# Patient Record
Sex: MALE | Race: WHITE
[De-identification: names, ages, dates, MRNs, and addresses within clinical notes are randomized per-mention and may not be internally consistent; named-entity substitution may affect disease eponyms.]

---

## 2022-10-18 ENCOUNTER — HOSPITAL ENCOUNTER (OUTPATIENT)
Dept: HOSPITAL 90 - WHH | Age: 77
Discharge: HOME | End: 2022-10-18
Attending: FAMILY MEDICINE
Payer: OTHER GOVERNMENT

## 2022-10-18 DIAGNOSIS — Z79.899: ICD-10-CM

## 2022-10-18 DIAGNOSIS — M19.90: ICD-10-CM

## 2022-10-18 DIAGNOSIS — F17.290: ICD-10-CM

## 2022-10-18 DIAGNOSIS — E66.9: ICD-10-CM

## 2022-10-18 DIAGNOSIS — L97.811: ICD-10-CM

## 2022-10-18 DIAGNOSIS — F17.200: ICD-10-CM

## 2022-10-18 DIAGNOSIS — I50.20: ICD-10-CM

## 2022-10-18 DIAGNOSIS — I87.2: ICD-10-CM

## 2022-10-18 DIAGNOSIS — I11.0: ICD-10-CM

## 2022-10-18 DIAGNOSIS — L97.821: ICD-10-CM

## 2022-10-18 DIAGNOSIS — I89.0: Primary | ICD-10-CM

## 2022-10-24 ENCOUNTER — HOSPITAL ENCOUNTER (EMERGENCY)
Dept: HOSPITAL 90 - EDH | Age: 77
Discharge: HOME | End: 2022-10-24
Payer: OTHER GOVERNMENT

## 2022-10-24 VITALS — HEIGHT: 71 IN | WEIGHT: 315 LBS | BODY MASS INDEX: 44.1 KG/M2

## 2022-10-24 VITALS — SYSTOLIC BLOOD PRESSURE: 110 MMHG | DIASTOLIC BLOOD PRESSURE: 68 MMHG

## 2022-10-24 DIAGNOSIS — Z88.6: ICD-10-CM

## 2022-10-24 DIAGNOSIS — J44.9: ICD-10-CM

## 2022-10-24 DIAGNOSIS — Z88.8: ICD-10-CM

## 2022-10-24 DIAGNOSIS — Z98.890: ICD-10-CM

## 2022-10-24 DIAGNOSIS — B37.2: Primary | ICD-10-CM

## 2022-10-24 DIAGNOSIS — L20.9: ICD-10-CM

## 2022-10-24 LAB
ALBUMIN SERPL-MCNC: 2.5 G/DL (ref 3.5–5)
ALT SERPL-CCNC: 29 U/L (ref 12–78)
AST SERPL-CCNC: 19 U/L (ref 10–37)
BASOPHILS NFR BLD AUTO: 0.6 % (ref 0–5)
BUN SERPL-MCNC: 26 MG/DL (ref 7–18)
CHLORIDE SERPL-SCNC: 100 MMOL/L (ref 101–111)
CO2 SERPL-SCNC: 31 MMOL/L (ref 21–32)
CREAT SERPL-MCNC: 1.5 MG/DL (ref 0.5–1.5)
EOSINOPHIL NFR BLD AUTO: 7.3 % (ref 0–8)
ERYTHROCYTE [DISTWIDTH] IN BLOOD BY AUTOMATED COUNT: 15.7 % (ref 11–15.5)
GFR SERPL CREATININE-BSD FRML MDRD: 48 ML/MIN (ref 60–?)
GLUCOSE SERPL-MCNC: 139 MG/DL (ref 70–105)
HCT VFR BLD AUTO: 37.1 % (ref 42–54)
LYMPHOCYTES NFR SPEC AUTO: 18.4 % (ref 21–51)
MCH RBC QN AUTO: 29 PG (ref 27–33)
MCHC RBC AUTO-ENTMCNC: 32.1 G/DL (ref 32–36)
MCV RBC AUTO: 90.5 FL (ref 79–99)
MONOCYTES NFR BLD AUTO: 8.9 % (ref 3–13)
NEUTROPHILS NFR BLD AUTO: 63.7 % (ref 40–77)
NRBC BLD MANUAL-RTO: 0 % (ref 0–0.19)
PLATELET # BLD AUTO: 263 K/UL (ref 130–400)
POTASSIUM SERPL-SCNC: 3.9 MMOL/L (ref 3.5–5.1)
PROT SERPL-MCNC: 7.2 G/DL (ref 6–8.3)
RBC # BLD AUTO: 4.1 MIL/UL (ref 4.5–6.2)
SODIUM SERPL-SCNC: 134 MMOL/L (ref 136–145)
WBC # BLD AUTO: 7 K/UL (ref 4.8–10.8)

## 2022-10-24 PROCEDURE — 36415 COLL VENOUS BLD VENIPUNCTURE: CPT

## 2022-10-24 PROCEDURE — 80053 COMPREHEN METABOLIC PANEL: CPT

## 2022-10-24 PROCEDURE — 85025 COMPLETE CBC W/AUTO DIFF WBC: CPT

## 2022-10-25 ENCOUNTER — HOSPITAL ENCOUNTER (OUTPATIENT)
Dept: HOSPITAL 90 - WHH | Age: 77
Discharge: HOME | End: 2022-10-25
Attending: FAMILY MEDICINE
Payer: OTHER GOVERNMENT

## 2022-10-25 DIAGNOSIS — L97.822: ICD-10-CM

## 2022-10-25 DIAGNOSIS — L97.812: ICD-10-CM

## 2022-10-25 DIAGNOSIS — E66.9: ICD-10-CM

## 2022-10-25 DIAGNOSIS — F17.200: ICD-10-CM

## 2022-10-25 DIAGNOSIS — M19.90: ICD-10-CM

## 2022-10-25 DIAGNOSIS — I50.20: ICD-10-CM

## 2022-10-25 DIAGNOSIS — Z79.899: ICD-10-CM

## 2022-10-25 DIAGNOSIS — I87.2: ICD-10-CM

## 2022-10-25 DIAGNOSIS — I11.0: ICD-10-CM

## 2022-10-25 DIAGNOSIS — I89.0: Primary | ICD-10-CM

## 2022-10-25 PROCEDURE — 29580 STRAPPING UNNA BOOT: CPT

## 2023-02-20 ENCOUNTER — HOSPITAL ENCOUNTER (OUTPATIENT)
Dept: HOSPITAL 90 - WHH | Age: 78
Discharge: HOME | End: 2023-02-20
Attending: FAMILY MEDICINE
Payer: OTHER GOVERNMENT

## 2023-02-20 DIAGNOSIS — I87.2: ICD-10-CM

## 2023-02-20 DIAGNOSIS — F17.200: ICD-10-CM

## 2023-02-20 DIAGNOSIS — M19.90: ICD-10-CM

## 2023-02-20 DIAGNOSIS — Z79.899: ICD-10-CM

## 2023-02-20 DIAGNOSIS — I89.0: Primary | ICD-10-CM

## 2023-02-20 DIAGNOSIS — E66.9: ICD-10-CM

## 2023-02-20 DIAGNOSIS — I50.20: ICD-10-CM

## 2023-02-20 DIAGNOSIS — L97.812: ICD-10-CM

## 2023-02-20 DIAGNOSIS — I11.0: ICD-10-CM

## 2023-02-20 DIAGNOSIS — J44.9: ICD-10-CM

## 2023-02-20 DIAGNOSIS — L97.822: ICD-10-CM

## 2023-02-20 PROCEDURE — 29580 STRAPPING UNNA BOOT: CPT

## 2023-03-20 ENCOUNTER — HOSPITAL ENCOUNTER (OUTPATIENT)
Dept: HOSPITAL 90 - WHH | Age: 78
Discharge: HOME | End: 2023-03-20
Attending: FAMILY MEDICINE
Payer: OTHER GOVERNMENT

## 2023-03-20 DIAGNOSIS — I11.0: ICD-10-CM

## 2023-03-20 DIAGNOSIS — I50.20: ICD-10-CM

## 2023-03-20 DIAGNOSIS — E66.9: ICD-10-CM

## 2023-03-20 DIAGNOSIS — I89.0: Primary | ICD-10-CM

## 2023-03-20 DIAGNOSIS — L97.822: ICD-10-CM

## 2023-03-20 DIAGNOSIS — I87.2: ICD-10-CM

## 2023-03-20 DIAGNOSIS — F17.200: ICD-10-CM

## 2023-03-20 DIAGNOSIS — L97.812: ICD-10-CM

## 2023-03-20 DIAGNOSIS — M19.90: ICD-10-CM

## 2023-03-20 DIAGNOSIS — Z79.899: ICD-10-CM

## 2023-03-20 DIAGNOSIS — J44.9: ICD-10-CM

## 2023-05-03 ENCOUNTER — HOSPITAL ENCOUNTER (OUTPATIENT)
Dept: HOSPITAL 90 - EDH | Age: 78
Setting detail: OBSERVATION
LOS: 1 days | Discharge: NURSING, REHAB OR LONG-TERM CARE | End: 2023-05-04
Attending: INTERNAL MEDICINE | Admitting: INTERNAL MEDICINE
Payer: OTHER GOVERNMENT

## 2023-05-03 VITALS — WEIGHT: 315 LBS | HEIGHT: 71 IN | BODY MASS INDEX: 44.1 KG/M2

## 2023-05-03 DIAGNOSIS — J44.9: ICD-10-CM

## 2023-05-03 DIAGNOSIS — I25.10: ICD-10-CM

## 2023-05-03 DIAGNOSIS — Z91.199: ICD-10-CM

## 2023-05-03 DIAGNOSIS — I89.0: ICD-10-CM

## 2023-05-03 DIAGNOSIS — N18.9: ICD-10-CM

## 2023-05-03 DIAGNOSIS — L97.929: ICD-10-CM

## 2023-05-03 DIAGNOSIS — Z98.890: ICD-10-CM

## 2023-05-03 DIAGNOSIS — Z95.5: ICD-10-CM

## 2023-05-03 DIAGNOSIS — G25.81: ICD-10-CM

## 2023-05-03 DIAGNOSIS — E78.00: ICD-10-CM

## 2023-05-03 DIAGNOSIS — Z51.5: ICD-10-CM

## 2023-05-03 DIAGNOSIS — F17.200: ICD-10-CM

## 2023-05-03 DIAGNOSIS — G47.33: ICD-10-CM

## 2023-05-03 DIAGNOSIS — Z79.899: ICD-10-CM

## 2023-05-03 DIAGNOSIS — L03.116: ICD-10-CM

## 2023-05-03 DIAGNOSIS — Z88.1: ICD-10-CM

## 2023-05-03 DIAGNOSIS — I13.0: ICD-10-CM

## 2023-05-03 DIAGNOSIS — I50.9: ICD-10-CM

## 2023-05-03 DIAGNOSIS — L97.919: ICD-10-CM

## 2023-05-03 DIAGNOSIS — E66.01: ICD-10-CM

## 2023-05-03 DIAGNOSIS — L03.115: Primary | ICD-10-CM

## 2023-05-03 DIAGNOSIS — Z86.73: ICD-10-CM

## 2023-05-03 DIAGNOSIS — N17.9: ICD-10-CM

## 2023-05-03 LAB
ALBUMIN SERPL-MCNC: 2.3 G/DL (ref 3.5–5)
ALT SERPL-CCNC: 57 U/L (ref 12–78)
AST SERPL-CCNC: 42 U/L (ref 10–37)
BASOPHILS NFR BLD AUTO: 0.4 % (ref 0–5)
BUN SERPL-MCNC: 68 MG/DL (ref 7–18)
CHLORIDE SERPL-SCNC: 99 MMOL/L (ref 101–111)
CO2 SERPL-SCNC: 28 MMOL/L (ref 21–32)
CREAT SERPL-MCNC: 1.8 MG/DL (ref 0.5–1.5)
EOSINOPHIL NFR BLD AUTO: 1.6 % (ref 0–8)
ERYTHROCYTE [DISTWIDTH] IN BLOOD BY AUTOMATED COUNT: 16.1 % (ref 11–15.5)
GFR SERPL CREATININE-BSD FRML MDRD: 38 ML/MIN (ref 90–?)
GLUCOSE SERPL-MCNC: 103 MG/DL (ref 70–105)
HCT VFR BLD AUTO: 36.1 % (ref 42–54)
LYMPHOCYTES NFR SPEC AUTO: 16.6 % (ref 21–51)
MCH RBC QN AUTO: 28.9 PG (ref 27–33)
MCHC RBC AUTO-ENTMCNC: 32.7 G/DL (ref 32–36)
MCV RBC AUTO: 88.5 FL (ref 79–99)
MONOCYTES NFR BLD AUTO: 10 % (ref 3–13)
NEUTROPHILS NFR BLD AUTO: 70.5 % (ref 40–77)
NRBC BLD MANUAL-RTO: 0 % (ref 0–0.19)
PLATELET # BLD AUTO: 330 K/UL (ref 130–400)
POTASSIUM SERPL-SCNC: 4 MMOL/L (ref 3.5–5.1)
PROT SERPL-MCNC: 6.7 G/DL (ref 6–8.3)
RBC # BLD AUTO: 4.08 MIL/UL (ref 4.5–6.2)
SODIUM SERPL-SCNC: 134 MMOL/L (ref 136–145)
WBC # BLD AUTO: 7.9 K/UL (ref 4.8–10.8)

## 2023-05-03 PROCEDURE — 96366 THER/PROPH/DIAG IV INF ADDON: CPT

## 2023-05-03 PROCEDURE — 96367 TX/PROPH/DG ADDL SEQ IV INF: CPT

## 2023-05-03 PROCEDURE — 83735 ASSAY OF MAGNESIUM: CPT

## 2023-05-03 PROCEDURE — 87040 BLOOD CULTURE FOR BACTERIA: CPT

## 2023-05-03 PROCEDURE — 85025 COMPLETE CBC W/AUTO DIFF WBC: CPT

## 2023-05-03 PROCEDURE — 83605 ASSAY OF LACTIC ACID: CPT

## 2023-05-03 PROCEDURE — 99284 EMERGENCY DEPT VISIT MOD MDM: CPT

## 2023-05-03 PROCEDURE — 94640 AIRWAY INHALATION TREATMENT: CPT

## 2023-05-03 PROCEDURE — 93005 ELECTROCARDIOGRAM TRACING: CPT

## 2023-05-03 PROCEDURE — 96365 THER/PROPH/DIAG IV INF INIT: CPT

## 2023-05-03 PROCEDURE — 84100 ASSAY OF PHOSPHORUS: CPT

## 2023-05-03 PROCEDURE — 84145 PROCALCITONIN (PCT): CPT

## 2023-05-03 PROCEDURE — 96372 THER/PROPH/DIAG INJ SC/IM: CPT

## 2023-05-03 PROCEDURE — 94664 DEMO&/EVAL PT USE INHALER: CPT

## 2023-05-03 PROCEDURE — 81003 URINALYSIS AUTO W/O SCOPE: CPT

## 2023-05-03 PROCEDURE — 80053 COMPREHEN METABOLIC PANEL: CPT

## 2023-05-03 PROCEDURE — 36415 COLL VENOUS BLD VENIPUNCTURE: CPT

## 2023-05-03 PROCEDURE — 80048 BASIC METABOLIC PNL TOTAL CA: CPT

## 2023-05-04 VITALS — SYSTOLIC BLOOD PRESSURE: 112 MMHG | DIASTOLIC BLOOD PRESSURE: 67 MMHG

## 2023-05-04 LAB
APPEARANCE UR: CLEAR
BASOPHILS NFR BLD AUTO: 0.5 % (ref 0–5)
BILIRUB UR QL STRIP: NEGATIVE MG/DL
BUN SERPL-MCNC: 60 MG/DL (ref 7–18)
CHLORIDE SERPL-SCNC: 100 MMOL/L (ref 101–111)
CO2 SERPL-SCNC: 29 MMOL/L (ref 21–32)
COLOR UR: (no result)
CREAT SERPL-MCNC: 1.6 MG/DL (ref 0.5–1.5)
EOSINOPHIL NFR BLD AUTO: 2.3 % (ref 0–8)
ERYTHROCYTE [DISTWIDTH] IN BLOOD BY AUTOMATED COUNT: 16.1 % (ref 11–15.5)
GFR SERPL CREATININE-BSD FRML MDRD: 44 ML/MIN (ref 90–?)
GLUCOSE SERPL-MCNC: 118 MG/DL (ref 70–105)
GLUCOSE UR STRIP-MCNC: NEGATIVE MG/DL
HCT VFR BLD AUTO: 34.4 % (ref 42–54)
HGB UR QL STRIP: NEGATIVE
KETONES UR STRIP-MCNC: NEGATIVE MG/DL
LEUKOCYTE ESTERASE UR QL STRIP: NEGATIVE LEU/UL
LYMPHOCYTES NFR SPEC AUTO: 14.1 % (ref 21–51)
MAGNESIUM SERPL-MCNC: 2.3 MG/DL (ref 1.8–2.4)
MCH RBC QN AUTO: 28.8 PG (ref 27–33)
MCHC RBC AUTO-ENTMCNC: 32.3 G/DL (ref 32–36)
MCV RBC AUTO: 89.4 FL (ref 79–99)
MONOCYTES NFR BLD AUTO: 12.1 % (ref 3–13)
NEUTROPHILS NFR BLD AUTO: 70.1 % (ref 40–77)
NITRITE UR QL STRIP: NEGATIVE
NRBC BLD MANUAL-RTO: 0 % (ref 0–0.19)
PH UR STRIP: 5 [PH] (ref 5–8)
PHOSPHATE SERPL-MCNC: 4.1 MG/DL (ref 2.5–4.9)
PLATELET # BLD AUTO: 292 K/UL (ref 130–400)
POTASSIUM SERPL-SCNC: 4.2 MMOL/L (ref 3.5–5.1)
PROT UR QL STRIP: NEGATIVE MG/DL
RBC # BLD AUTO: 3.85 MIL/UL (ref 4.5–6.2)
SODIUM SERPL-SCNC: 135 MMOL/L (ref 136–145)
SP GR UR STRIP: 1.01 (ref 1–1.03)
UROBILINOGEN UR STRIP-MCNC: 0.2 MG/DL (ref 0.2–1)
WBC # BLD AUTO: 6.4 K/UL (ref 4.8–10.8)

## 2023-05-04 RX ADMIN — IPRATROPIUM BROMIDE AND ALBUTEROL SULFATE SCH UDVIAL: .5; 3 SOLUTION RESPIRATORY (INHALATION) at 07:19

## 2023-05-04 RX ADMIN — IPRATROPIUM BROMIDE AND ALBUTEROL SULFATE SCH UDVIAL: .5; 3 SOLUTION RESPIRATORY (INHALATION) at 11:36

## 2023-09-12 ENCOUNTER — HOSPITAL ENCOUNTER (EMERGENCY)
Dept: HOSPITAL 90 - EDH | Age: 78
Discharge: HOME | End: 2023-09-12
Payer: OTHER GOVERNMENT

## 2023-09-12 VITALS — HEIGHT: 71 IN | WEIGHT: 315 LBS | BODY MASS INDEX: 44.1 KG/M2

## 2023-09-12 VITALS — RESPIRATION RATE: 20 BRPM | HEART RATE: 88 BPM | DIASTOLIC BLOOD PRESSURE: 78 MMHG | SYSTOLIC BLOOD PRESSURE: 156 MMHG

## 2023-09-12 DIAGNOSIS — L03.115: Primary | ICD-10-CM

## 2023-09-12 DIAGNOSIS — I50.9: ICD-10-CM

## 2023-09-12 DIAGNOSIS — Z79.899: ICD-10-CM

## 2023-09-12 DIAGNOSIS — Z88.8: ICD-10-CM

## 2023-09-12 DIAGNOSIS — Z88.1: ICD-10-CM

## 2023-09-12 DIAGNOSIS — L03.116: ICD-10-CM

## 2023-09-12 DIAGNOSIS — Z88.6: ICD-10-CM

## 2023-09-12 DIAGNOSIS — I11.0: ICD-10-CM

## 2023-09-12 DIAGNOSIS — Z88.0: ICD-10-CM

## 2023-09-12 DIAGNOSIS — G25.81: ICD-10-CM

## 2023-09-12 DIAGNOSIS — Z98.890: ICD-10-CM

## 2023-09-12 PROCEDURE — 93971 EXTREMITY STUDY: CPT

## 2024-11-26 ENCOUNTER — HOSPITAL ENCOUNTER (INPATIENT)
Dept: HOSPITAL 90 - EDH | Age: 79
LOS: 7 days | Discharge: NURSING, REHAB OR LONG-TERM CARE | DRG: 871 | End: 2024-12-03
Attending: INTERNAL MEDICINE | Admitting: INTERNAL MEDICINE
Payer: OTHER GOVERNMENT

## 2024-11-26 VITALS — OXYGEN SATURATION: 96 % | HEART RATE: 73 BPM | RESPIRATION RATE: 28 BRPM

## 2024-11-26 VITALS — OXYGEN SATURATION: 91 %

## 2024-11-26 VITALS — WEIGHT: 315 LBS | HEIGHT: 71 IN | BODY MASS INDEX: 44.1 KG/M2

## 2024-11-26 VITALS — RESPIRATION RATE: 18 BRPM | HEART RATE: 81 BPM

## 2024-11-26 VITALS — RESPIRATION RATE: 20 BRPM | HEART RATE: 83 BPM | OXYGEN SATURATION: 94 %

## 2024-11-26 VITALS — HEART RATE: 83 BPM | RESPIRATION RATE: 20 BRPM

## 2024-11-26 DIAGNOSIS — J15.69: ICD-10-CM

## 2024-11-26 DIAGNOSIS — Z88.6: ICD-10-CM

## 2024-11-26 DIAGNOSIS — Z83.3: ICD-10-CM

## 2024-11-26 DIAGNOSIS — N17.0: ICD-10-CM

## 2024-11-26 DIAGNOSIS — J96.01: ICD-10-CM

## 2024-11-26 DIAGNOSIS — Z20.822: ICD-10-CM

## 2024-11-26 DIAGNOSIS — N30.00: ICD-10-CM

## 2024-11-26 DIAGNOSIS — J44.0: ICD-10-CM

## 2024-11-26 DIAGNOSIS — F43.10: ICD-10-CM

## 2024-11-26 DIAGNOSIS — E78.5: ICD-10-CM

## 2024-11-26 DIAGNOSIS — Z79.899: ICD-10-CM

## 2024-11-26 DIAGNOSIS — Z82.49: ICD-10-CM

## 2024-11-26 DIAGNOSIS — Z87.891: ICD-10-CM

## 2024-11-26 DIAGNOSIS — A41.9: Primary | ICD-10-CM

## 2024-11-26 DIAGNOSIS — Z86.718: ICD-10-CM

## 2024-11-26 DIAGNOSIS — I25.10: ICD-10-CM

## 2024-11-26 DIAGNOSIS — L97.919: ICD-10-CM

## 2024-11-26 DIAGNOSIS — Z88.0: ICD-10-CM

## 2024-11-26 DIAGNOSIS — L97.819: ICD-10-CM

## 2024-11-26 DIAGNOSIS — J96.02: ICD-10-CM

## 2024-11-26 DIAGNOSIS — L97.829: ICD-10-CM

## 2024-11-26 DIAGNOSIS — D63.8: ICD-10-CM

## 2024-11-26 DIAGNOSIS — I11.0: ICD-10-CM

## 2024-11-26 DIAGNOSIS — Z88.3: ICD-10-CM

## 2024-11-26 DIAGNOSIS — Z88.1: ICD-10-CM

## 2024-11-26 DIAGNOSIS — B96.1: ICD-10-CM

## 2024-11-26 DIAGNOSIS — E66.01: ICD-10-CM

## 2024-11-26 DIAGNOSIS — Z99.81: ICD-10-CM

## 2024-11-26 DIAGNOSIS — G25.81: ICD-10-CM

## 2024-11-26 DIAGNOSIS — L03.115: ICD-10-CM

## 2024-11-26 DIAGNOSIS — I50.33: ICD-10-CM

## 2024-11-26 DIAGNOSIS — Z95.5: ICD-10-CM

## 2024-11-26 DIAGNOSIS — Z88.8: ICD-10-CM

## 2024-11-26 DIAGNOSIS — L03.116: ICD-10-CM

## 2024-11-26 DIAGNOSIS — I87.2: ICD-10-CM

## 2024-11-26 DIAGNOSIS — E83.42: ICD-10-CM

## 2024-11-26 DIAGNOSIS — Z16.12: ICD-10-CM

## 2024-11-26 DIAGNOSIS — N40.0: ICD-10-CM

## 2024-11-26 DIAGNOSIS — G47.33: ICD-10-CM

## 2024-11-26 DIAGNOSIS — R73.9: ICD-10-CM

## 2024-11-26 DIAGNOSIS — G89.29: ICD-10-CM

## 2024-11-26 DIAGNOSIS — L97.929: ICD-10-CM

## 2024-11-26 DIAGNOSIS — M54.9: ICD-10-CM

## 2024-11-26 DIAGNOSIS — J44.1: ICD-10-CM

## 2024-11-26 LAB
APPEARANCE UR: CLEAR
BACTERIA URNS QL MICRO: (no result) /HPF
BASE EXCESS BLDA CALC-SCNC: 2.1 MMOL/L (ref -2–3)
BILIRUB UR QL STRIP: NEGATIVE MG/DL
BNP SERPL-MCNC: 100 PG/ML (ref 0–100)
BUN SERPL-MCNC: 17 MG/DL (ref 7–18)
CHLORIDE SERPL-SCNC: 102 MMOL/L (ref 101–111)
CO2 SERPL-SCNC: 35 MMOL/L (ref 21–32)
COHGB BLD-MCNC: 0.8 % (ref 0.5–1.5)
COLOR UR: YELLOW
CREAT SERPL-MCNC: 1.3 MG/DL (ref 0.5–1.3)
DEPRECATED SQUAMOUS URNS QL MICRO: (no result) /HPF (ref 0–2)
ERYTHROCYTE [DISTWIDTH] IN BLOOD BY AUTOMATED COUNT: 16.9 % (ref 11–15.5)
GAS PNL BLDA: (no result)
GAS PNL BLDA: 37 CELSIUS (ref 35.5–37)
GFR SERPL CREATININE-BSD FRML MDRD: 56 ML/MIN (ref 90–?)
GLUCOSE SERPL-MCNC: 94 MG/DL (ref 70–105)
GLUCOSE UR STRIP-MCNC: NEGATIVE MG/DL
HCO3 BLDA-SCNC: 29.5 MMOL/L (ref 21–28)
HCT VFR BLD AUTO: 37.8 % (ref 42–54)
HGB UR QL STRIP: NEGATIVE
KETONES UR STRIP-MCNC: 5 MG/DL
LEUKOCYTE ESTERASE UR QL STRIP: 25 LEU/UL
MAGNESIUM SERPL-MCNC: 1.6 MG/DL (ref 1.8–2.4)
MCH RBC QN AUTO: 26.5 PG (ref 27–33)
MCHC RBC AUTO-ENTMCNC: 29.6 G/DL (ref 32–36)
MCV RBC AUTO: 89.6 FL (ref 79–99)
MICRO URNS: YES
MUCOUS THREADS URNS QL MICRO: (no result) LPF
NITRITE UR QL STRIP: NEGATIVE
NRBC BLD MANUAL-RTO: 0 % (ref 0–0.19)
PCO2 BLDA: 60 MMHG (ref 35–48)
PH BLDA: 7.31 [PH] (ref 7.35–7.45)
PH UR STRIP: 5 [PH] (ref 5–8)
PLATELET # BLD AUTO: 314 K/UL (ref 130–400)
PO2 BLDA: 72.5 MMHG (ref 83–108)
POTASSIUM SERPL-SCNC: 3.8 MMOL/L (ref 3.5–5.1)
PROT UR QL STRIP: 10 MG/DL
RBC # BLD AUTO: 4.22 MIL/UL (ref 4.5–6.2)
RBC #/AREA URNS HPF: (no result) /HPF (ref 0–1)
RBC MORPH BLD: (no result)
SAO2 % BLDA: 93 % (ref 94–98)
SARS-COV-2 RNA SPEC QL NAA+PROBE: (no result)
SODIUM SERPL-SCNC: 139 MMOL/L (ref 136–145)
SP GR UR STRIP: 1.02 (ref 1–1.03)
UROBILINOGEN UR STRIP-MCNC: 0.2 MG/DL (ref 0.2–1)
VENTILATION MODE VENT: (no result)
WBC # BLD AUTO: 5.1 K/UL (ref 4.8–10.8)
WBC #/AREA URNS HPF: (no result) /HPF (ref 0–1)

## 2024-11-26 PROCEDURE — 87205 SMEAR GRAM STAIN: CPT

## 2024-11-26 PROCEDURE — 94660 CPAP INITIATION&MGMT: CPT

## 2024-11-26 PROCEDURE — 80048 BASIC METABOLIC PNL TOTAL CA: CPT

## 2024-11-26 PROCEDURE — 82947 ASSAY GLUCOSE BLOOD QUANT: CPT

## 2024-11-26 PROCEDURE — 87186 SC STD MICRODIL/AGAR DIL: CPT

## 2024-11-26 PROCEDURE — 93306 TTE W/DOPPLER COMPLETE: CPT

## 2024-11-26 PROCEDURE — 85025 COMPLETE CBC W/AUTO DIFF WBC: CPT

## 2024-11-26 PROCEDURE — 96375 TX/PRO/DX INJ NEW DRUG ADDON: CPT

## 2024-11-26 PROCEDURE — 83605 ASSAY OF LACTIC ACID: CPT

## 2024-11-26 PROCEDURE — 87040 BLOOD CULTURE FOR BACTERIA: CPT

## 2024-11-26 PROCEDURE — 85027 COMPLETE CBC AUTOMATED: CPT

## 2024-11-26 PROCEDURE — 80053 COMPREHEN METABOLIC PANEL: CPT

## 2024-11-26 PROCEDURE — 83880 ASSAY OF NATRIURETIC PEPTIDE: CPT

## 2024-11-26 PROCEDURE — 93005 ELECTROCARDIOGRAM TRACING: CPT

## 2024-11-26 PROCEDURE — 99285 EMERGENCY DEPT VISIT HI MDM: CPT

## 2024-11-26 PROCEDURE — 82435 ASSAY OF BLOOD CHLORIDE: CPT

## 2024-11-26 PROCEDURE — 94640 AIRWAY INHALATION TREATMENT: CPT

## 2024-11-26 PROCEDURE — 84295 ASSAY OF SERUM SODIUM: CPT

## 2024-11-26 PROCEDURE — 82948 REAGENT STRIP/BLOOD GLUCOSE: CPT

## 2024-11-26 PROCEDURE — 85018 HEMOGLOBIN: CPT

## 2024-11-26 PROCEDURE — 94664 DEMO&/EVAL PT USE INHALER: CPT

## 2024-11-26 PROCEDURE — 93970 EXTREMITY STUDY: CPT

## 2024-11-26 PROCEDURE — 87641 MR-STAPH DNA AMP PROBE: CPT

## 2024-11-26 PROCEDURE — A6248 HYDROGEL DRSG GEL FILLER: HCPCS

## 2024-11-26 PROCEDURE — 36415 COLL VENOUS BLD VENIPUNCTURE: CPT

## 2024-11-26 PROCEDURE — 87804 INFLUENZA ASSAY W/OPTIC: CPT

## 2024-11-26 PROCEDURE — 36600 WITHDRAWAL OF ARTERIAL BLOOD: CPT

## 2024-11-26 PROCEDURE — 87635 SARS-COV-2 COVID-19 AMP PRB: CPT

## 2024-11-26 PROCEDURE — 84484 ASSAY OF TROPONIN QUANT: CPT

## 2024-11-26 PROCEDURE — 84145 PROCALCITONIN (PCT): CPT

## 2024-11-26 PROCEDURE — A6250 SKIN SEAL PROTECT MOISTURIZR: HCPCS

## 2024-11-26 PROCEDURE — 87071 CULTURE AEROBIC QUANT OTHER: CPT

## 2024-11-26 PROCEDURE — 84132 ASSAY OF SERUM POTASSIUM: CPT

## 2024-11-26 PROCEDURE — 87086 URINE CULTURE/COLONY COUNT: CPT

## 2024-11-26 PROCEDURE — 84100 ASSAY OF PHOSPHORUS: CPT

## 2024-11-26 PROCEDURE — 84443 ASSAY THYROID STIM HORMONE: CPT

## 2024-11-26 PROCEDURE — 81001 URINALYSIS AUTO W/SCOPE: CPT

## 2024-11-26 PROCEDURE — 71250 CT THORAX DX C-: CPT

## 2024-11-26 PROCEDURE — 83735 ASSAY OF MAGNESIUM: CPT

## 2024-11-26 PROCEDURE — 71045 X-RAY EXAM CHEST 1 VIEW: CPT

## 2024-11-26 PROCEDURE — 82803 BLOOD GASES ANY COMBINATION: CPT

## 2024-11-26 RX ADMIN — ALBUTEROL SULFATE SCH MG: 2.5 SOLUTION RESPIRATORY (INHALATION) at 22:55

## 2024-11-26 NOTE — HMCIMG
CHEST 1VW



HISTORY: Shortness of breath



COMPARISON: 5/8/2024



FINDINGS: A frontal projection of the chest was obtained. Mild bilateral

pulmonary infiltrates are seen may be related to mild pulmonary vascular

congestion with possible superimposed pneumonitis.  The heart is

borderline enlarged.  Degenerative changes are seen.  No evidence of

aortic calcification is seen.



IMPRESSION:

1. Mild bilateral pulmonary infiltrates are seen may be related to mild

pulmonary vascular congestion with possible superimposed pneumonitis.

## 2024-11-26 NOTE — NUR
Pain

Patient reported pain score 10/10 to BLE. Morphine 2mg IV push given at this time to 20G to 
right forearm. Patient tolerating bipap.

## 2024-11-26 NOTE — HP
[facility] HISTORY AND PHYSICAL








Date of Service: Nov 26, 2024 


Time of Service: 23:10





LEO MOTA MD (PCP)





Supervising/attending physicians:  Dr. GAYATHRI Feliciano and Dr. Shell





HISTORY OF PRESENT ILLNESS:


Mr. Lozoya is an 79-year-old  male with a history of hypertension, CAD,

obstructive sleep apnea, COPD, O2 dependent usually on 3 L O2, chronic knee and 

back pain, AAA, and chronic bilateral lower extremity venous ulcers, chronic 

lower extremity cellulitis who presented to [facility] [place] for evaluation of 

shortness of breath.  According to EMS [facility].  The patient reported cough, congestion, and some mild 

difficulty breathing. 





Chest x-ray:  Mild bilateral pulmonary infiltrates are seen may be related to 

mild pulmonary vascular congestion with possible superimposed pneumonitis.  ABGs

:  PH 7.308, pCO2 60, PO2 72.5, bicarbonate 29.5, O2 sats 93.





ED provider request patient be admitted to the hospital with a diagnosis of 

pneumonia, status dermatitis to bilateral lower extremities, and COPD 

exacerbation.





I went to assess patient at bedside in ED 15.  The patient was tachypneic, 

wheezing, and lungs sounded wet.  Patient had a very productive cough, and was 

spitting phlegm into a napkin while the I assessed him.  I informed the patient 

of labs, including ABGs, diagnostics, plan of care.  The patient verbalized 

understanding and since agreement with the plan.  Plan and assessment are listed

below.





REVIEW OF SYSTEMS


12-point ROS reviewed with patient.  All pertinent positives mentioned above.  

Otherwise negative, noncontributory, or non-pertinent.





PAST MEDICAL HISTORY:


Morbid obesity


COPD


Obstructive sleep apnea


Restless leg syndrome


Chronic bilateral lower extremity venous ulcers


Chronic lower extremity cellulitis





PAST SURGICAL HISTORY:


Back surgery x2


cardiac stents





PAST SOCIAL HISTORY:


Patient denies illicit drug use but has history of occasional smoking and 

alcohol drinking





FAMILY HISTORY:


CAD, DM, HTN


Coded Allergies:  


     piperacillin (Verified  Allergy, Severe, ANAPHYLAXIS, 3/28/23)


   3/26/2023


     tazobactam (Verified  Allergy, Severe, ANAPHYLAXIS, 3/28/23)


   3/26/2023


     vancomycin (Verified  Allergy, Severe, anaphylaxis, 3/28/23)


   3/26/2023.


     gabapentin (Unverified  Adverse Reaction, Unknown, 10/24/22)


   GI UPSET.


     naproxen (Unverified  Adverse Reaction, Unknown, 10/24/22)


   GI UPSET





PHYSICAL EXAM


GENERAL APPEARANCE:  The patient is awake, alert, and oriented, in no acute 

cardiopulmonary distress.


NEUROLOGICAL:  Cranial nerves II-XII grossly intact.  Motor is 5/5 in bilateral 

upper and lower extremities proximal to distal.  No sensory deficits.


HEENT:  Face is symmetric. Pupils are equal and reactive.  Extraocular movements

are intact.


NECK:  Supple.  No JVD. No thyromegaly. No submental, submandibular, pre-

/postauricular, occipital or supraclavicular lymphadenopathy.


CHEST:  Normal chest expansion. No Telemetry.


LUNGS:  Tachypneic, wheezing, and lungs sounded wet.  Patient had a very 

productive cough, and was spitting phlegm into a napkin while the I assessed 

him.  


CARDIOVASCULAR:  Regular.  S1 and S2 normal.  No appreciable rubs, murmurs or 

gallops. 


ABDOMEN:  Soft, nontender, and nondistended.  There is no rebound, voluntary 

guarding, or rigidity.


:  Deferred. No Antunez.


EXTREMITIES:  Non-edematous and not cyanotic.  No clubbing.  Good capillary 

refill.


SKIN:  No skin breakdown.





                           Vital Sign (Last 24 Hours)








 11/26/24 11/26/24 11/26/24





 17:49 20:26 22:59


 


Temp 98.4  


 


Pulse   83


 


Resp   20


 


B/P (MAP)  126/48 


 


Pulse Ox  98 


 


O2 Delivery   N/Cannula Low lpm


 


O2 Flow Rate   3.0


 


FiO2   32











LABS:








                                   Laboratory:








Test


 11/26/24


21:13 11/26/24


21:05 11/26/24


18:34 11/26/24


18:17 Range/Units


 


 


Urine Color YELLOW     YELLOW  


 


Urine Appearance CLEAR     CLEAR  


 


Urine pH 5.0     5.0-8.0  


 


Urine Specific Gravity 1.024     1.001-1.031  


 


Urine Protein 10 H    NEGATIVE  mg/dL


 


Urine Glucose (UA) NEGATIVE     NEGATIVE  mg/dL


 


Urine Ketones 5 H    NEGATIVE  mg/dL


 


Urine Occult Blood NEGATIVE     NEGATIVE  


 


Urine Nitrate NEGATIVE     NEGATIVE  


 


Urine Bilirubin NEGATIVE     NEGATIVE  mg/dL


 


Urine Urobilinogen 0.2     0.2-1.0  mg/dL


 


Urine Leukocyte Esterase


 25 H


 


 


 


 NEGATIVE


Pete/uL


 


Urine RBC 0-1     0-1  /HPF


 


Urine WBC 2-5 H    0-1  /HPF


 


Urine Squamous Epithelial


Cells RARE 


 


 


 


 0-2  /HPF





 


Urine Bacteria RARE     None Seen  /HPF


 


Influenza Type A Antigen


 


 Negative For


Type A 


 


 NEGATIVE  





 


Influenza Type B Antigen


 


 Negative For


Type B 


 


 NEGATIVE  





 


SARS-CoV-2, RNA, NAAT


 


 NEGATIVE SARS


CoV-2 


 


 NEGATIVE  





 


Blood Gas Specimen Type   Arterial    


 


Arterial Blood pH   7.308 L  7.350-7.450  


 


Arterial Blood Partial


Pressure CO2 


 


 60 *H


 


 35-48  mmHg





 


Arterial Blood Partial


Pressure O2 


 


 72.5 L


 


 83.0-108.0


mmHg


 


Arterial Blood HCO3


 


 


 29.5 H


 


 21.0-28.0


mmol/L


 


Arterial Blood Oxygen


Saturation 


 


 93.0 L


 


 94.0-98.0  %





 


Arterial Blood Base Excess


 


 


 2.1 


 


 -2.0-3.0


mmol/L


 


Hemoglobin (Blood Gas)   11.3 L  13.5-17.5  g/dL


 


Sodium (Blood Gas)   139   136-145  MMOL/L


 


Bedside Potassium (Blood Gas)   3.9   3.4-4.5  MMOL/L


 


Bedside Chloride (Blood Gas)   103     MMOL/L


 


Bedside Glucose (Blood Gas)   103 H  65-95  MG/DL


 


Bedside Ionized Calcium


(Blood Gas) 


 


 1.13 L


 


 1.15-1.33


MMOL/L


 


Bedside Lactic Acid (Blood


Gas) 


 


 1.14 H


 


 0.36-0.75


MMOL/L


 


Blood Gas Temperature


 


 


 37.0 


 


 35.5-37.0


CELSIUS


 


Blood Gas Flow-by


 


 


 3.00 


 


 0.00-15.00


L/min


 


Blood Gas Vent Mode   3LNC   ROOM AIR  


 


FiO2   32.0    %


 


Blood Gas Specimen Comment   RR RN    


 


White Blood Count    5.1  4.8-10.8  K/uL


 


Red Blood Count


 


 


 


 4.22 L


 4.50-6.20


MIL/uL


 


Hemoglobin    11.2 L 14.0-18.0  g/dL


 


Hematocrit    37.8 L 42-54  %


 


Mean Corpuscular Volume    89.6  79-99  fL


 


Mean Corpuscular Hemoglobin    26.5 L 27.0-33.0  pg


 


Mean Corpuscular Hemoglobin


Concent 


 


 


 29.6 L


 32.0-36.0  g/dL





 


Red Cell Distribution Width    16.9 H 11.0-15.5  %


 


Platelet Count    314  130-400  K/uL


 


Mean Platelet Volume    9.1  7.5-10.5  fL


 


Nucleated Red Blood Cells    0.0  0.0-0.19  %


 


Red Blood Cell Morphology    See comments   


 


Sodium Level    139  136-145  mmol/L


 


Potassium Level    3.8  3.5-5.1  mmol/L


 


Chloride Level    102  101-111  mmol/L


 


Carbon Dioxide Level    35 H 21-32  mmol/L


 


Blood Urea Nitrogen    17  7-18  mg/dL


 


Creatinine    1.3  0.5-1.3  mg/dL


 


Glomerular Filtration Rate


Calc 


 


 


 56 


 >90  mL/min





 


Random Glucose    94    mg/dL


 


Lactic Acid Level    2.0  0.8-2.5  mmol/L


 


Total Calcium    8.4 L 8.5-10.1  mg/dL


 


Magnesium Level


 


 


 


 1.60 L


 1.80-2.40


mg/dL


 


Troponin I High Sensitivity    7  4-75  ng/L


 


B-Type Natriuretic Peptide    100  0-100  pg/mL


 


Procalcitonin    < 0.05 L 0.05-0.5  ng/mL











                               Current Medications








 Medications


  (Trade)  Dose


 Ordered  Sig/Christi


 Route


 PRN Reason  Start Time


 Stop Time Status Last Admin


Dose Admin


 


 Acetylcysteine


  (MUComyst 20%


 4ML)  400 mg  [id number]


 


   11/26/24 22:10


 12/26/24 22:09  11/26/24 22:55


400 MG


 


 Albuterol Sulfate


  (Proventil


 0.083% 2.5mg/3ml)  2.5 mg  [id number]


 


   11/26/24 22:00


 12/26/24 21:59  11/26/24 22:55


2.5 MG


 


 Ipratropium


 Bromide


  (AtrovENT UD)  0.5 mg  [id number]


 


   11/26/24 22:00


 12/26/24 21:59  11/26/24 22:55


0.5 MG











DIAGNOSTICS / RADIOLOGY:


[ ]





ASSESSMENT:


Acute hypoxemic hypercapnic respiratory failure, requiring BiPAP 


COPD exacerbation


Acute complicated cystitis, POA


Chronic bilateral lower extremity venous ulcers


Acute on chronic lower extremity cellulitis


Anemia of chronic disease 


Hypomagnesemia


Hyperglycemia


Obstructive sleep apnea


Morbid obesity, BMI 41.1


Chronic problem list:  HLP, BPH, anemia, chronic back and knee pain, PTSD, 

morbid obesity





PLAN:


-Admit to PCCU with continuous telemetry monitoring


-Monitor respiratory status closely.


-Start BiPAP, titrate oxygen prn to keep Spo2>/+=92%.


-monitor ABGs and chest x-ray


-Albuterol and Atrovent nebulizer treatments scheduled q.4 hours.  


-RT to provide IS and education on use.


-Robitussin DM as needed cough.


-Start montelukast 10 mg p.o. daily.


-start Mucomyst 400 mg IH q.4 hours.


-Solu-Medrol 60 mg IV q.6 hours


-Continue antibiotic therapy:  Levaquin IV (allergies to vancomycin and 

piperacillin,tazobactam)


-Consult pulmonology for acute hypoxemic and hypercapnic respiratory failure, 

COPD exacerbation.  


-Consult infectious disease for acute on chronic/ recurrent lower extremity 

cellulitis.


-p.r.n. medications for: Pain management, fever, nausea, vomiting, constipation,

hypertension


-Glucometer checks AC & HS needed with insulin regular sliding scale coverage as

needed.


-Blood pressure checks every 4 hours and as needed. 


-Reconcile home medications once available. 


-AM labs: monitor renal and liver function, monitor electrolytes and replace PRN


-GI and DVT prophylaxis











SHARI RUDD FN          Nov 26, 2024 23:10

## 2024-11-26 NOTE — NUR
[facility] call back

Received call back from Kareen STOKES. Advised of patient's complaint of pain to BLE. 
As per Marisol STOKES: Morphine 2mg IV push x1 dose now. Orders for Tylenol PRN have been 
placed. Orders noted and carried out.

## 2024-11-26 NOTE — NUR
Arrival on floor

Patient arrived on floor. Patient was able to transfer to bed with x1 assistance. Noted 
dressing to BLE with purulent drainage. Dressings will need to be changed. Noted swelling to 
mid abdomen when laying flat. Patient with history of abdominal aortic aneurysm. Patient 
verbalized that he has a wound to his buttock as well. Patient was changed at this time. 
Patient currently on an adult brief from home. Brief changed at this time. Gown applied to 
patient along with telemetry monitor. 20G IV noted to right forearm near wrist. Currently on 
ordered antibiotic. Bipap ordered per admitting NP. Respiratory at bedside. Patient voiced 
he did not need bipap. I advised him to tolerate as much as possible due to abnormal ABGs. 
Patient verbalized understanding. Will continue to monitor.

## 2024-11-26 NOTE — NUR
Pain

Patient reported pain to BLE. No pain medication noted on file. Call placed to [facility] 
hospitalist group answering service. Pending call back from Kareen STOKES.

## 2024-11-26 NOTE — ERN
General


Chief Complaint:  Shortness of Breath


Stated Complaint:  SOB


Time Seen by MD:  17:51


Time Seen by Midlevel:  17:51


Source:  patient, EMS





History of Present Illness


Initial Comments


Patient is an 79-year-old male being brought in via EMS for evaluation of 

shortness of breath.  According to EMS [facility] 

[facility].  On arrival patient does report cough congestion and some 

mild difficulty breathing.  He does admit to being O2 dependent and is usually 

on 3 L of oxygen.


Allergies:  


Coded Allergies:  


     piperacillin (Verified  Allergy, Severe, ANAPHYLAXIS, 3/28/23)


   3/26/2023


     tazobactam (Verified  Allergy, Severe, ANAPHYLAXIS, 3/28/23)


   3/26/2023


     vancomycin (Verified  Allergy, Severe, anaphylaxis, 3/28/23)


   3/26/2023.


     gabapentin (Unverified  Adverse Reaction, Unknown, 10/24/22)


   GI UPSET.


     naproxen (Unverified  Adverse Reaction, Unknown, 10/24/22)


   GI UPSET


Home Meds


Reported Medications


Ondansetron HCl (Ondansetron HCl) 4 Mg Tablet, 4 MG PO Q6HPRN, TAB


   24


Metolazone (Metolazone) 5 Mg Tablet, 5 MG PO Q2D, TAB


   24


Prazosin HCl (Prazosin HCl) 1 Mg Capsule, 1 MG PO HS, CAP


   24


Atorvastatin Calcium (LIPITOR) 40 Mg Tablet, 40 MG PO HS, TAB


   24


Sertraline HCl (Sertraline HCl) 25 Mg Tablet, 25 MG PO DAILY, TAB


   24


Ropinirole HCl (Ropinirole HCl) 4 Mg Tablet, 4 MG PO QIDP PRN for LEG PAIN, TAB


   23


Multivitamin (Multivitamin) 1 Each Tablet, 1 EACH PO DAILY, TAB


   3/27/23


Tamsulosin HCl (Flomax) 0.4 Mg Cap.er.24h, 0.4 MG PO HS, CAPSULE.DR


   3/27/23


Spironolactone (Spironolactone) 25 Mg Tablet, 25 MG PO DAILY, TAB


   3/27/23


Venlafaxine HCl (Venlafaxine HCl) 50 Mg Tablet, 150 MG PO DAILY, TAB


   3/27/23


Potassium Chloride (Potassium Chloride) 20 Meq Tablet.er, 40 MEQ PO DAILY, TAB


   3/27/23


Simvastatin (ZOCOR) 40 Mg Tablet, 40 MG PO HS, TAB


   3/27/23


Mirtazapine (Mirtazapine) 30 Mg Tablet, 30 MG PO HS, TAB


   3/27/23





Past Medical History


Past Medical History:  CAD, COPD, Hypertension


Medical History Other:  SLEEP APNEA, RLS, CHRONIC KNEE AND BACK PAIN, AAA, FABIANA

LULITIS LOWER LEGS


Past Surgical History:  Other


Surgical History Other:  BACK SX





Family History


Family History:  CAD, DM, HTN





Social History


Social History:  Negative, Lives with family





ROS Dictation


CONSTITUTIONAL:  Negative except for HPI


HEAD/FACE:  Negative except for HPI


EENT:  Negative except for HPI


RESPIRATORY: Negative except for HPI


GASTROINTESTINAL/ABDOMINAL:   Negative except for HPI


GENITOURINARY: Negative except for HPI


MUSCULOSKELETAL: Negative except for HPI


INTEGUMENTARY:  Negative except for HPI


NEUROLOGICAL/PSYCH: Negative except for HPI


HEMATOLOGIC/LYMPHATIC:  Negative except for HPI





All Systems Negative, Except as noted above.





13 point review of systems assessed and all negative except for above.





Physical Exam


Physical Exam Dictation


Vital Signs reviewed 


General Appearance: Alert, oriented x 3, no acute distress, ill-appearing, 

morbidly obese


Head and Face: non-traumatic.


Eyes: PERRL, pink conjunctivas, eyelid no trauma, anterior chamber with arcus 

senilis. 


Ears: Pinnas intact and no signs of trauma or erythema ear canals clear and no 

discharge TM no erythema 


Nose: No discharge, no bleeding. 


Oropharynx: Mouth normal, tongue pink, 


pharynx clear,no erythema, tonsils no exudates, no abscesses noted,  mucous 

membrane moist 


Neck: Supple, non-tender, no thyromegaly, no masses, no JVD, no bruits 


Breast:Deferred 


Chest:No tenderness, no crepitus, no paradoxical movement, no retractions 


Lungs:  Diffuse wheezing to bilateral lung fields, productive cough during my 

examination


Heart: Regular rate, regular rhythm, no murmur, no gallops 


Vascular:  Extensive amount of swelling to bilateral lower extremities


Abdomen: Soft, positive bowel sounds, nondistended, no guarding, 


nontender, no rebound, no masses no hepatomegaly, no splenomegaly, no Rm's 

sign, no hernias.


Rectal: Deferred


Genital: Deferred


Neurological: Normal speech,  motor function intact, sensory function intact 


Musculoskeletal: Neck nontender, full range of motion, back nontender, full 

range of motion,


Extremities: nontender, full range of motion 


Skin:  Stasis dermatitis to bilateral lower extremities, there was also erythema

to bilateral lower extremities with multiple superficial skin tear/abrasions


Lymphatic: Deferred





Results


Laboratory and Microbiology


Lab and Micro Result





Laboratory Tests








Test


 24


18:17 24


18:34


 


White Blood Count


 5.1 K/uL


(4.8-10.8) 





 


Red Blood Count


 4.22 MIL/uL


(4.50-6.20)  L 





 


Hemoglobin


 11.2 g/dL


(14.0-18.0)  L 





 


Hematocrit


 37.8 % (42-54)


L 





 


Mean Corpuscular Volume


 89.6 fL


(79-99) 





 


Mean Corpuscular Hemoglobin


 26.5 pg


(27.0-33.0)  L 





 


Mean Corpuscular Hemoglobin


Concent 29.6 g/dL


(32.0-36.0)  L 





 


Red Cell Distribution Width


 16.9 %


(11.0-15.5)  H 





 


Platelet Count


 314 K/uL


(130-400) 





 


Mean Platelet Volume


 9.1 fL


(7.5-10.5) 





 


Nucleated Red Blood Cells


 0.0 %


(0.0-0.19) 





 


Red Blood Cell Morphology See comments   


 


Sodium Level


 139 mmol/L


(136-145) 





 


Potassium Level


 3.8 mmol/L


(3.5-5.1) 





 


Chloride Level


 102 mmol/L


(101-111) 





 


Carbon Dioxide Level


 35 mmol/L


(21-32)  H 





 


Blood Urea Nitrogen


 17 mg/dL


(7-18) 





 


Creatinine


 1.3 mg/dL


(0.5-1.3) 





 


Glomerular Filtration Rate


Calc 56 mL/min


(>90) 





 


Random Glucose


 94 mg/dL


() 





 


Lactic Acid Level


 2.0 mmol/L


(0.8-2.5) 





 


Total Calcium


 8.4 mg/dL


(8.5-10.1)  L 





 


Magnesium Level


 1.60 mg/dL


(1.80-2.40)  L 





 


Troponin I High Sensitivity 7 ng/L (4-75)   


 


B-Type Natriuretic Peptide


 100 pg/mL


(0-100) 





 


Procalcitonin


 < 0.05 ng/mL


(0.05-0.5)  L 





 


Blood Gas Specimen Type  Arterial  


 


Arterial Blood pH


 


 7.308


(7.350-7.450)


 


Arterial Blood Partial


Pressure CO2 


 60 mmHg


(35-48)  *H


 


Arterial Blood Partial


Pressure O2 


 72.5 mmHg


(83.0-108.0)  L


 


Arterial Blood HCO3


 


 29.5 mmol/L


(21.0-28.0)  H


 


Arterial Blood Oxygen


Saturation 


 93.0 %


(94.0-98.0)  L


 


Arterial Blood Base Excess


 


 2.1 mmol/L


(-2.0-3.0)


 


Hemoglobin (Blood Gas)


 


 11.3 g/dL


(13.5-17.5)  L


 


Sodium (Blood Gas)


 


 139 MMOL/L


(136-145)


 


Bedside Potassium (Blood Gas)


 


 3.9 MMOL/L


(3.4-4.5)


 


Bedside Chloride (Blood Gas)


 


 103 MMOL/L


()


 


Bedside Glucose (Blood Gas)


 


 103 MG/DL


(65-95)  H


 


Bedside Ionized Calcium


(Blood Gas) 


 1.13 MMOL/L


(1.15-1.33)  L


 


Bedside Lactic Acid (Blood


Gas) 


 1.14 MMOL/L


(0.36-0.75)  H


 


Blood Gas Temperature


 


 37.0 CELSIUS


(35.5-37.0)


 


Blood Gas Flow-by


 


 3.00 L/min


(0.00-15.00)


 


Blood Gas Vent Mode


 


 3LNC (ROOM


AIR)


 


FiO2  32.0 %  


 


Blood Gas Specimen Comment  RR RN  








Labs Reviewed?:  Yes





MDM


MDM: 


Differential diagnosis:  COPD exacerbation, pneumonia, cellulitis


Rationale: Tests considered and ordered secondary to shared decision making 

include: 


Previous outside records reviewed: Old ER visits.


Risk of complication and/or morbidity or mortality of patient management: None


Medications-Per medication reconciliation


Need for hospitalization: Patient does meet criteria for hospitalization.


Need for emergency major/minor surgery: No





There are no social concerns with this patient.





Prescription drug management


Prescriptions will include symptomatic care





Patient's prior external medical records from other ER visits were reviewed by m

e as indicated.  Prior testing and results from previous visits were reviewed.  

Prior tests were taken into account with medical decision making and resource 

utilization, independent historian/historians were used to obtain complete 

medical history.





I independently interpreted the test that were performed, results were reviewed 

by me and considered findings on radiology if ordered.





Medical management and examination interpretation discussions were had by me 

with other qualified healthcare professionals as indicated for the patient's 

care.


ED Course





Orders








Procedure Category Date Status





  Time 


 


Cbc Without LAB 24 Complete





Differential  18:02 


 


Basic Metabolic Panel LAB 24 Complete





  18:02 


 


B-Type Natriuretic LAB 24 Complete





Peptide  18:02 


 


Troponin I High LAB 24 Complete





Sensitivity  18:02 


 


Lactic Acid LAB 24 Complete





  18:02 


 


Blood Cult JAY 24 In Process





  18:02 


 


12 Lead Ekg Tracing- EKG 24 Logged





Technical  18:02 


 


Chest 1vw RAD 24 Resulted





  18:02 


 


Arterial Blood Gas + RT 24 Transmitted





  18:04 


 


Ipratropium/Albuterol PHA 24 Complete





Neb (Duoneb)  18:30 


 


Procalcitonin LAB 24 Complete





  18:08 


 


Urinalysis Profile LAB 24 Complete





  18:08 


 


Magnesium LAB 24 Complete





  18:02 


 


Arterial Blood Gas LAB 24 Complete





Arterial +  18:34 


 


Levofloxacin 500 PHA 24 Complete





Mg/D5w 100 Ml  20:30 


 


Methylprednisolone PHA 24 Complete





Succ 125mg (Solu-Medr  20:30 


 


Covid Rna Naat LAB 24 Complete





  20:29 


 


Influenza Type A & B, LAB 24 Complete





Rapid  20:29 








Current Medications








 Medications


  (Trade)  Dose


 Ordered  Sig/Christi


 Route


 PRN Reason  Start Time


 Stop Time Status Last Admin


Dose Admin


 


 Albuterol


  (DUOneb)  1 UDVIAL  ONCE  ONCE


 IH


   24 18:30


 24 18:31 DC 24 18:31





 


 Levofloxacin/


 Dextrose  100 ml @ 


 100 mls/hr  ONCE  ONCE


 IV


   24 20:30


 24 21:29 DC 24 21:03





 


 Methylprednisolone


 Sodium Succinate


  (Solu-medROL


 125MG)  125 mg  ONCE  ONCE


 IVP


   24 20:30


 24 20:34 DC 24 21:03











Vital Signs








  Date Time  Temp Pulse Resp B/P (MAP) Pulse Ox O2 Delivery O2 Flow Rate FiO2


 


24 20:26  85 16 126/48 98 Nasal Cannula* 4 36


 


24 18:39  81 18     


 


24 18:32  80 14 108/50 98 Nasal Cannula* 2 28


 


24 17:49 98.4 80 20 124/68 93 Nasal Cannula 2.0 





[facility]


                    [address] [place] [address]


                                 [phone]


                                        


                                 IMAGING REPORT


                                     Signed





PATIENT: GEOVANY RODRIGUEZ                            MR#: [id number]


ACCOUNT#: [id number]                              : 1945


SEX: M AGE: 79                                      LOCATION: [facility]


ORDER DT: 24                             STATUS: REG ER


ACCESSION#: [id number]                            REPORT#: [id number]


SERVICE DT: 24





REASON: SOB


ORDERING PHYSICIAN: SILVIA KEATING


PROCEDURE: CXR1VW  -  CHEST 1VW





CHEST 1VW





HISTORY: Shortness of breath





COMPARISON: 2024





FINDINGS: A frontal projection of the chest was obtained. Mild bilateral


pulmonary infiltrates are seen may be related to mild pulmonary vascular


congestion with possible superimposed pneumonitis.  The heart is


borderline enlarged.  Degenerative changes are seen.  No evidence of


aortic calcification is seen.





IMPRESSION:


1. Mild bilateral pulmonary infiltrates are seen may be related to mild


pulmonary vascular congestion with possible superimposed pneumonitis. 











DICTATED BY: JAEL CELAYA MD


DATE: 24





ELECTRONICALLY SIGNED BY: JAEL CELAYA MD


DATE: 24








DX & DISP


Disposition:  Inpatient


Decision to Admit Date:  2024


Decision to Admit Time:  20:37


Departure


Impression:  


   Primary Impression:  Pneumonia


   Additional Impressions:  Stasis dermatitis of both legs, COPD exacerbation


Condition:  Stable


Referrals:  


LEO MOTA MD (PCP)


I have reviewed the case, and I agree with, Diagnosis and Plan


I performed the substantive portion of the visit.  I have reviewed and 

personally made and approve the management plan that is documented in the note 

by myself or the SUHA. I acknowledge for responsibility for the patient's 

management plan.











SILVIA KEATING                2024 20:36

## 2024-11-26 NOTE — NUR
Report

Report received from Lc ABRAHAM at this time. States nurse practitioner in room currently 
assessing patient. Will be transferred to room 302 after assessment done. Patient is 
currently on 3L/NC saturating 94%. Patient does not ambulate but is awake, alert and 
oriented x4.

## 2024-11-27 VITALS
TEMPERATURE: 97.6 F | RESPIRATION RATE: 19 BRPM | DIASTOLIC BLOOD PRESSURE: 62 MMHG | HEART RATE: 81 BPM | SYSTOLIC BLOOD PRESSURE: 126 MMHG

## 2024-11-27 VITALS
SYSTOLIC BLOOD PRESSURE: 106 MMHG | HEART RATE: 76 BPM | DIASTOLIC BLOOD PRESSURE: 51 MMHG | TEMPERATURE: 98.6 F | RESPIRATION RATE: 18 BRPM

## 2024-11-27 VITALS
HEART RATE: 86 BPM | DIASTOLIC BLOOD PRESSURE: 69 MMHG | SYSTOLIC BLOOD PRESSURE: 143 MMHG | RESPIRATION RATE: 19 BRPM | TEMPERATURE: 98.8 F

## 2024-11-27 VITALS
SYSTOLIC BLOOD PRESSURE: 118 MMHG | HEART RATE: 91 BPM | DIASTOLIC BLOOD PRESSURE: 50 MMHG | RESPIRATION RATE: 20 BRPM | TEMPERATURE: 98.9 F

## 2024-11-27 VITALS — RESPIRATION RATE: 28 BRPM | HEART RATE: 81 BPM | OXYGEN SATURATION: 94 %

## 2024-11-27 VITALS — RESPIRATION RATE: 28 BRPM | OXYGEN SATURATION: 92 % | HEART RATE: 83 BPM

## 2024-11-27 VITALS — HEART RATE: 91 BPM | RESPIRATION RATE: 25 BRPM

## 2024-11-27 VITALS — OXYGEN SATURATION: 91 %

## 2024-11-27 VITALS — HEART RATE: 83 BPM | RESPIRATION RATE: 28 BRPM

## 2024-11-27 VITALS — RESPIRATION RATE: 28 BRPM | HEART RATE: 81 BPM

## 2024-11-27 VITALS — OXYGEN SATURATION: 93 %

## 2024-11-27 VITALS
TEMPERATURE: 97.4 F | RESPIRATION RATE: 19 BRPM | HEART RATE: 83 BPM | SYSTOLIC BLOOD PRESSURE: 141 MMHG | DIASTOLIC BLOOD PRESSURE: 56 MMHG

## 2024-11-27 VITALS — RESPIRATION RATE: 25 BRPM | OXYGEN SATURATION: 93 % | HEART RATE: 86 BPM

## 2024-11-27 VITALS — HEART RATE: 88 BPM | RESPIRATION RATE: 22 BRPM

## 2024-11-27 VITALS
TEMPERATURE: 98.6 F | DIASTOLIC BLOOD PRESSURE: 44 MMHG | HEART RATE: 85 BPM | RESPIRATION RATE: 18 BRPM | SYSTOLIC BLOOD PRESSURE: 113 MMHG

## 2024-11-27 VITALS — HEART RATE: 90 BPM | OXYGEN SATURATION: 90 % | RESPIRATION RATE: 22 BRPM

## 2024-11-27 VITALS
HEART RATE: 81 BPM | TEMPERATURE: 98 F | DIASTOLIC BLOOD PRESSURE: 53 MMHG | SYSTOLIC BLOOD PRESSURE: 104 MMHG | RESPIRATION RATE: 19 BRPM

## 2024-11-27 VITALS — HEART RATE: 88 BPM | OXYGEN SATURATION: 92 % | RESPIRATION RATE: 22 BRPM

## 2024-11-27 VITALS — HEART RATE: 89 BPM | RESPIRATION RATE: 28 BRPM

## 2024-11-27 VITALS — RESPIRATION RATE: 28 BRPM | HEART RATE: 93 BPM

## 2024-11-27 LAB
BASE EXCESS BLDA CALC-SCNC: 0.3 MMOL/L (ref -2–3)
BASOPHILS # BLD AUTO: 0 K/UL (ref 0–0.2)
BASOPHILS NFR BLD AUTO: 0 % (ref 0–5)
BUN SERPL-MCNC: 15 MG/DL (ref 7–18)
CHLORIDE SERPL-SCNC: 105 MMOL/L (ref 101–111)
CO2 SERPL-SCNC: 31 MMOL/L (ref 21–32)
CREAT SERPL-MCNC: 1.2 MG/DL (ref 0.5–1.3)
EOSINOPHIL # BLD AUTO: 0.01 K/UL (ref 0–0.7)
EOSINOPHIL NFR BLD AUTO: 0.2 % (ref 0–8)
ERYTHROCYTE [DISTWIDTH] IN BLOOD BY AUTOMATED COUNT: 16.5 % (ref 11–15.5)
GAS PNL BLDA: 37 CELSIUS (ref 35.5–37)
GFR SERPL CREATININE-BSD FRML MDRD: 61 ML/MIN (ref 90–?)
GLUCOSE SERPL-MCNC: 172 MG/DL (ref 70–105)
HCO3 BLDA-SCNC: 28.8 MMOL/L (ref 21–28)
HCT VFR BLD AUTO: 36.1 % (ref 42–54)
IMM GRANULOCYTES # BLD: 0.01 K/UL (ref 0–1)
LYMPHOCYTES # SPEC AUTO: 0.5 K/UL (ref 1–4.8)
LYMPHOCYTES NFR SPEC AUTO: 9.8 % (ref 21–51)
MAGNESIUM SERPL-MCNC: 1.8 MG/DL (ref 1.8–2.4)
MCH RBC QN AUTO: 26.9 PG (ref 27–33)
MCHC RBC AUTO-ENTMCNC: 29.6 G/DL (ref 32–36)
MCV RBC AUTO: 90.7 FL (ref 79–99)
MONOCYTES # BLD AUTO: 0.1 K/UL (ref 0.1–1)
MONOCYTES NFR BLD AUTO: 1.3 % (ref 3–13)
NEUTROPHILS # BLD AUTO: 4.1 K/UL (ref 1.8–7.7)
NEUTROPHILS NFR BLD AUTO: 88.5 % (ref 40–77)
NRBC BLD MANUAL-RTO: 0 % (ref 0–0.19)
PCO2 BLDA: 63 MMHG (ref 35–48)
PH BLDA: 7.28 [PH] (ref 7.35–7.45)
PHOSPHATE SERPL-MCNC: 3.6 MG/DL (ref 2.5–4.9)
PLATELET # BLD AUTO: 271 K/UL (ref 130–400)
PO2 BLDA: 67 MMHG (ref 83–108)
POTASSIUM SERPL-SCNC: 4.8 MMOL/L (ref 3.5–5.1)
RBC # BLD AUTO: 3.98 MIL/UL (ref 4.5–6.2)
SAO2 % BLDA: 90.4 % (ref 94–98)
SODIUM SERPL-SCNC: 141 MMOL/L (ref 136–145)
TSH SERPL DL<=0.05 MIU/L-ACNC: 1.33 UIU/ML (ref 0.36–3.74)
VENTILATION MODE VENT: (no result)
WBC # BLD AUTO: 4.6 K/UL (ref 4.8–10.8)
WBC MORPH BLD: (no result)

## 2024-11-27 RX ADMIN — ENOXAPARIN SODIUM SCH MG: 40 INJECTION SUBCUTANEOUS at 09:22

## 2024-11-27 RX ADMIN — CASTOR OIL AND BALSAM, PERU SCH GM: 788; 87 OINTMENT TOPICAL at 21:29

## 2024-11-27 RX ADMIN — SPIRONOLACTONE SCH MG: 25 TABLET, FILM COATED ORAL at 09:00

## 2024-11-27 RX ADMIN — SODIUM CHLORIDE SOLN NEBU 3% ONE ML: 3 NEBU SOLN at 19:06

## 2024-11-27 RX ADMIN — CHLORTHALIDONE SCH EACH: 50 TABLET ORAL at 21:00

## 2024-11-27 RX ADMIN — SODIUM CHLORIDE SOLN NEBU 3% ONE ML: 3 NEBU SOLN at 13:47

## 2024-11-27 RX ADMIN — MAGNESIUM SULFATE IN WATER SCH MLS/HR: 40 INJECTION, SOLUTION INTRAVENOUS at 06:27

## 2024-11-27 RX ADMIN — SODIUM CHLORIDE SOLN NEBU 3% ONE ML: 3 NEBU SOLN at 23:19

## 2024-11-27 RX ADMIN — LINEZOLID SCH MLS/HR: 600 INJECTION, SOLUTION INTRAVENOUS at 16:14

## 2024-11-27 NOTE — NUR
Pain assessment

Patient denied any pain at this time. Patient in bed, hob elevated. No signs or symptoms of 
discomfort noted at this time.

## 2024-11-27 NOTE — EKG
[facility]

                                       

Test Date:    2024               Test Time:    18:10:20

Pat Name:     GEOVANY RODRIGUEZ              Department:   [facility]

Patient ID:   [id number]           Room:         302 1

Gender:       M                        Technician:   [id number]

:          1945               Requested By: SILVIA KEATING

Order Number: [id number]        Reading MD:   Amisha Kim

                                 Measurements

Intervals                              Axis          

Rate:         79                       P:            42

MS:           228                      QRS:          3

QRSD:         96                       T:            59

QT:           413                                    

QTc:          475                                    

                           Interpretive Statements

Sinus rhythm

Prolonged MS interval

Low voltage, precordial leads

Compared to ECG 2023 01:40:54

Low QRS voltage now present

Sinus tachycardia no longer present

Electronically Signed On 2024 17:25:38 CST by Amisha Kim



Please click the below link to view image of tracing.  used

## 2024-11-27 NOTE — NUR
Pain score 8/10

Patient given tramadol 50 mg tablet by mouth for pain score of 8/10 to BLE at this time.

## 2024-11-27 NOTE — HMCSR
--------------- APPROVED REPORT --------------





EXAM: Two-dimensional and M-mode echocardiogram with Doppler and color Doppler.



Study Details: Hx:Morbid obesity, COPD, Obstructive sleep apnea, Restless leg syndrome, Chronic bilat
eral lower extremity venous ulcers, Chronic lower extremity cellulitis



INDICATION

ICD:  Chest discomfort, respiratory  distress



2D Dimensions

RVDd4.0 cmLVEF(%)64.7 (>50%)LVED Vol(simp.)110.0 mL

IVSd0.8 (0.7-1.1cm)FS(%)36 %LVES Vol(simp.)45.8 mL

LVDd5.6 (3.8-5.6cm)LA (2D)4.4 (1.6-4.0cm)LVEF(%, simp.)58 %

PWd1.3 (0.7-1.1cm)LVOT diam2.2 (1.8-2.4cm)LA ESV INDEX (4CH)25.00 mL/m2

IVSs1.1 cmIVC diam3.0 cm

LVDs3.6 (2.5-4.0cm)

PWs1.4 cm



M-Mode Dimensions

EPSS0.9 cm

LA (MM)4.4 (1.6-4.0cm)

Ao Root(MM)3.6 (2.0-3.7cm)



Aortic Valve

AoV VTI0.4 mAo Mean GR6.0 mmHgLVOT VTI0.30 m

AMANDA (VMAX)3.2 cm2AVA (VTI) 3.2 cm2



Mitral Valve

MV E Vmax72.8 cm/sDECEL [id number] ms

MV A Vmax81.0 cm/sP 1/2 T43 ms

E/A ratio0.9MVA (PHT)5.1 cm2



TDI

E/E' Medial8.5E/E' Lateral8.5

Medial E' Peak V8.60 cm/sLateral E' Peak V8.60 cm/s



Pulmonary Valve

PV Vmax1.1 m/s

PV Peak GR4.5 mmHg



Tricuspid Valve

RAP (EST) 15 zpDkROYC60.0 mmHg



Left Ventricle

The left ventricle is normal size. There is normal left ventricular wall thickness. LVEF is 55-60%. U
nable to assess Diastology due to poor tissue Doppler signals. 



Right Ventricle

The right ventricle is normal size. The right ventricular systolic function is normal.



Atria

The left atrium size is normal. The right atrium is moderately dilated.



Aortic Valve

Aortic valve is not well visualized but no significant valvular abnormalities noted. No aortic regurg
itation is present. There is no aortic valvular stenosis.



Mitral Valve

The mitral valve is normal in structure. There is no mitral valve regurgitation noted. There is no mi
tral valve stenosis.



Tricuspid Valve

The tricuspid valve is normal in structure. There is no tricuspid valve regurgitation noted.



Pulmonic Valve

Pulmonic valve is not well visualized.



Great Vessels

The aortic root is normal in size. IVC is dilated and collapses <50% with inspiration. 



Pericardium

There is no pericardial effusion.



 Other Information 

Quality : Technically difficult study due to body habitus. 

Technically limited study due to  body habitus.COPD.



Conclusion

The left ventricle is normal size. LVEF is 55-60%.

Unable to assess Diastology due to poor tissue Doppler signals.

The right ventricular systolic function is normal.

Both atria appear normal in size. 

No hemodynamically significant valvular abnormalities by Doppler gradients appreciated. 

IVC is dilated and collapses <50% with inspiration.

There is no pericardial effusion.

## 2024-11-27 NOTE — CONS
INFECTIOUS DISEASE CONSULTATION NOTE



DATE OF SERVICE:  11/27/2024



REQUESTING PHYSICIAN:  Dr. Feliciano.



REASON FOR CONSULTATION:  Cellulitis and antibiotic management.



HISTORY OF PRESENT ILLNESS:  An 79-year-old male with history of morbid obesity,

CHF, chronic cellulitis and lymphedema presented to the hospital with bilateral 

lower leg pain, swelling and redness.  The patient also complaining of shortness

of breath and coughing.  The patient found to have hypoxic respiratory failure, 

was admitted.  The patient has been placed on BiPAP.  The patient chest x-ray 

showed mild pulmonary congestion.  No fever or chills.  No diarrhea.  No 

abdominal pain.  The patient has chronic ulcer and cellulitis to both legs.  

Denies any trauma or fall.



PAST MEDICAL HISTORY:

   * Bilateral leg ulcer.

   * Lower extremities lymphedema.

   * Morbid obesity.

   * Obstructive sleep apnea.

   * Coronary artery disease.

   * Abdominal aortic aneurysm.



PAST SURGICAL HISTORY:

   * Back surgery.

   * PCI.



ALLERGIES:  Include:

   * GABAPENTIN.

   * NAPROXEN.

   * ZOSYN.

   * VANCOMYCIN.



CURRENT MEDICATIONS:  Include:

   * Levofloxacin.

   * DuoNeb

   * Solu-Medrol.

   * Tylenol.



SOCIAL HISTORY:  .  No alcohol, tobacco or illicit drug use.



FAMILY HISTORY:  Noncontributory.



REVIEW OF SYSTEMS:

CONSTITUTIONAL:  No fever, no chills, no weight loss or night sweats.

EYES:  No eye pain, no photophobia or diplopia.

HENT:  No sore throat, no rhinorrhea or earache.

NECK:  No neck pain or neck swelling.

RESPIRATORY:  Positive for cough.  No hemoptysis or pleuritic pain.

CARDIOVASCULAR:  Positive for shortness of breath.  No chest pain, no 

palpitation or orthopnea.

GASTROINTESTINAL:  Denied nausea, vomiting, no abdominal pain.

GENITOURINARY:  No dysuria, urgency or urinary frequency.

CENTRAL NERVOUS SYSTEM:  No headache, dyspnea, or slurred speech.

PSYCHIATRY:  No depression.  No suicidal ideation.

MUSCULOSKELETAL:  Positive for bilateral leg swelling, redness and pain.



PHYSICAL EXAMINATION:

GENERAL:  Elderly male, awake.

VITAL SIGNS:  Temperature 97.5, pulse 81, respiratory rate 19, /82.

EYES:  No icterus.  Pupils equal and reactive.

HENT:  No oral thrush seen.  Moist oral mucosa.

NECK:  Supple, no JVD or thyromegaly.

LUNGS:  Good air entry.  No rales, no rhonchi.

CARDIOVASCULAR:  S1, S2 regular.  No murmur heard.

ABDOMEN:  Morbidly obese, soft, nontender.  Bowel sound is present.

CENTRAL NERVOUS SYSTEM:  Awake, alert, oriented x 3.  No focal deficits.

SKIN:  No rashes, no itchiness.

LYMPHATIC:  There is bilateral inguinal lymphadenopathy.

BACK:  No deformity, no pressure ulcer.

EXTREMITIES: Extensive cellulitis with lymphedema and blister formation 

involving both legs, worse on the right side.



LABORATORY DATA:  Sodium 141, potassium 4.2, BUN 15, creatinine 1.2.  WBC 4.7, 

hemoglobin 10.7, platelet 217.  Influenza antigen negative.



RADIOLOGY:  Chest x-ray shows mild bilateral infiltrates.



ASSESSMENT:  An 79-year-old male presented with cough, shortness of breath.



CURRENT PROBLEM:  Include:

   * Bilateral leg ulcer.

   * Bilateral lower extremity cellulitis.

   * Hypoxic respiratory failure.

   * Acute on chronic heart failure.

   * Morbid obesity.

   * Obstructive sleep apnea.

   * Debility.



PLAN:

   * Continue wound care.

   * Continue linezolid.

   * Continue levofloxacin.

   * Continue BiPAP therapy.

   * Continue DVT prophylaxis.

   * Monitor electrolytes.

   * Continue bronchodilator.

   * Continue antiemetic.

   * The patient will be followed up closely.



Thank you for allowing me to participate in the care of this patient.







DD: 11/27/2024 04:11 PM

DT: 11/27/2024 08:06 PM

TID: [id number] RECEIPT: [id number]

MTD

## 2024-11-27 NOTE — NUR
mg/dL

Patient administered 4 units of humulin R subcutaneous to patient's left arm. Patient 
tolerated injection well.

## 2024-11-27 NOTE — NUR
IV insertion

Patient's IV pump continuously reading pressure high to current 20G to right forearm near 
wrist. New 20G IV inserted to mid-right forearm. IV infusion restarted on new IV. No high 
pressure readings given by IV pump. Patient tolerated insertion well.

## 2024-11-27 NOTE — NUR
[facility] Consult: 

Patient assessed by wound healing team. See wound assessment. Assessment and recommendations

provided to primary nurse. Education provided. Wound care done.

-------------------------------------------------------------------------------

Addendum: 11/27/24 at 1523 by JAIR DAWKINS RN RN/

-------------------------------------------------------------------------------

Amended: Links added.

## 2024-11-27 NOTE — PN
[facility] PROGRESS NOTE








Date of Service: Nov 27, 2024 


Time of Service: 13:20


Admitting  dr Jiang





SUBJECTIVE:


[  Mr. Lozoya is an 79-year-old  male with a history of hypertension, 

CAD, obstructive sleep apnea, COPD, O2 dependent usually on 3 L O2, chronic knee

 and back pain, AAA, and chronic bilateral lower extremity venous ulcers, 

chronic lower extremity cellulitis who presented to [facility] ED via EMS for 

evaluation of shortness of breath.  According to EMS [facility].  The patient reported cough, congestion, and 

some mild difficulty breathing. 


Chest x-ray:  Mild bilateral pulmonary infiltrates are seen may be related to 

mild pulmonary vascular congestion with possible superimposed pneumonitis.  ABGs

:  PH 7.308, pCO2 60, PO2 72.5, bicarbonate 29.5, O2 sats 93.


ED provider request patient be admitted to the hospital with a diagnosis of 

pneumonia, status dermatitis to bilateral lower extremities, and COPD exacerbat

ion.





11/27 patient was seen by nurse practitioner and physician during rounding in 

room 302 lying in bed.  Patient just underwent 2D echo and at this moment he is 

on 6 L nasal cannula.  RT was called to place patient on a BiPAP due to most 

recent ABG gases abnormal results.  We are pending further 

evaluation/recommendations of pulmonologist, ID and wound.  Patient continues to

 be on levofloxacin methylprednisolone IV ipratropium and albuterol.  We will 

continue to monitor patient in the meantime.  A.m. labs.]





REVIEW OF SYSTEMS


12-point ROS reviewed with patient.  All pertinent positives mentioned above.  

Otherwise negative, noncontributory, or non-pertinent.





PHYSICAL EXAM


GENERAL APPEARANCE:  The patient is awake, alert, and oriented, in no acute 

cardiopulmonary distress.


NEUROLOGICAL:  Cranial nerves II-XII grossly intact.  Motor is 5/5 in bilateral 

upper and lower extremities proximal to distal.  No sensory deficits.


HEENT:  Face is symmetric. Pupils are equal and reactive.  Extraocular movements

are intact.


NECK:  Supple.  No JVD. No thyromegaly. No submental, submandibular, pre-

/postauricular, occipital or supraclavicular lymphadenopathy.


CHEST:  Normal chest expansion. No Telemetry.


LUNGS:  Tachypneic, wheezing, and lungs sounded wet.  Patient had a very 

productive cough, and was spitting phlegm into a napkin while the I assessed 

him.  


CARDIOVASCULAR:  Regular.  S1 and S2 normal.  No appreciable rubs, murmurs or 

gallops. 


ABDOMEN:  Soft, nontender, and nondistended.  There is no rebound, voluntary 

guarding, or rigidity.


:  Deferred. No Antunez.


EXTREMITIES:  Non-edematous and not cyanotic.  No clubbing.  Good capillary 

refill.


SKIN:  No skin breakdown.





                             Vital Signs (last 8hr)








  Date Time  Temp Pulse Resp B/P (MAP) Pulse Ox O2 Delivery O2 Flow Rate FiO2


 


11/27/24 11:42 97.5 81 19 126/62 91 Nasal Cannula 5.0 


 


11/27/24 10:06  91 25     


 


11/27/24 10:04  86 25     40


 


11/27/24 08:15     91 Nasal Cannula* 3 32


 


11/27/24 07:48 97.3 83 19 141/56 90 Nasal Cannula 5.0 


 


11/27/24 06:43  88 22   N/Cannula Low lpm 5.0 


 


11/27/24 06:42  88 22     











LABS:








                                   Laboratory:








Test


 11/27/24


11:36 11/27/24


06:55 11/27/24


05:28 11/26/24


21:13 Range/Units


 


 


Whole Blood Glucose 208 H      MG/DL


 


Blood Gas Specimen Type  Arterial     


 


Arterial Blood pH  7.278 L   7.350-7.450  


 


Arterial Blood Partial


Pressure CO2 


 63 *H


 


 


 35-48  mmHg





 


Arterial Blood Partial


Pressure O2 


 67.0 L


 


 


 83.0-108.0


mmHg


 


Arterial Blood HCO3


 


 28.8 H


 


 


 21.0-28.0


mmol/L


 


Arterial Blood Oxygen


Saturation 


 90.4 L


 


 


 94.0-98.0  %





 


Arterial Blood Base Excess


 


 0.3 


 


 


 -2.0-3.0


mmol/L


 


Blood Gas Temperature


 


 37.0 


 


 


 35.5-37.0


CELSIUS


 


Blood Gas Flow-by


 


 5.00 


 


 


 0.00-15.00


L/min


 


Blood Gas Vent Mode  NC    ROOM AIR  


 


FiO2  40.0     %


 


Blood Gas Specimen Comment  LR LUCY     


 


White Blood Count   4.6 L  4.8-10.8  K/uL


 


Red Blood Count


 


 


 3.98 L


 


 4.50-6.20


MIL/uL


 


Hemoglobin   10.7 L  14.0-18.0  g/dL


 


Hematocrit   36.1 L  42-54  %


 


Mean Corpuscular Volume   90.7   79-99  fL


 


Mean Corpuscular Hemoglobin   26.9 L  27.0-33.0  pg


 


Mean Corpuscular Hemoglobin


Concent 


 


 29.6 L


 


 32.0-36.0  g/dL





 


Red Cell Distribution Width   16.5 H  11.0-15.5  %


 


Platelet Count   271   130-400  K/uL


 


Mean Platelet Volume   9.1   7.5-10.5  fL


 


Immature Granulocyte % (Auto)   0.2   0-1  %


 


Neutrophils (%) (Auto)   88.5 H  40.0-77.0  %


 


Lymphocytes (%) (Auto)   9.8 L  21.0-51.0  %


 


Monocytes (%) (Auto)   1.3 L  3.0-13.0  %


 


Eosinophils (%) (Auto)   0.2   0.0-8.0  %


 


Basophils (%) (Auto)   0.0   0.0-5.0  %


 


Neutrophils # (Auto)   4.1   1.8-7.7  K/uL


 


Lymphocytes # (Auto)   0.5 L  1.0-4.8  K/uL


 


Monocytes # (Auto)   0.1   0.1-1.0  K/uL


 


Eosinophils # (Auto)   0.01   0.00-0.70  K/uL


 


Basophils # (Auto)   0.00   0.00-0.20  K/uL


 


Absolute Immature Granulocyte


(auto 


 


 0.01 


 


 0-1  K/uL





 


Nucleated Red Blood Cells   0.0   0.0-0.19  %


 


White Cell Morphology Comment   See comments    


 


Sodium Level   141   136-145  mmol/L


 


Potassium Level   4.8   3.5-5.1  mmol/L


 


Chloride Level   105   101-111  mmol/L


 


Carbon Dioxide Level   31   21-32  mmol/L


 


Blood Urea Nitrogen   15   7-18  mg/dL


 


Creatinine   1.2   0.5-1.3  mg/dL


 


Glomerular Filtration Rate


Calc 


 


 61 


 


 >90  mL/min





 


Random Glucose   172 #H    mg/dL


 


Total Calcium   8.2 L  8.5-10.1  mg/dL


 


Phosphorus Level   3.6   2.5-4.9  mg/dL


 


Magnesium Level


 


 


 1.80 


 


 1.80-2.40


mg/dL


 


Thyroid Stimulating Hormone


(TSH) 


 


 1.33 


 


 0.36-3.74


uIU/mL


 


Urine Color    YELLOW  YELLOW  


 


Urine Appearance    CLEAR  CLEAR  


 


Urine pH    5.0  5.0-8.0  


 


Urine Specific Gravity    1.024  1.001-1.031  


 


Urine Protein    10 H NEGATIVE  mg/dL


 


Urine Glucose (UA)    NEGATIVE  NEGATIVE  mg/dL


 


Urine Ketones    5 H NEGATIVE  mg/dL


 


Urine Occult Blood    NEGATIVE  NEGATIVE  


 


Urine Nitrate    NEGATIVE  NEGATIVE  


 


Urine Bilirubin    NEGATIVE  NEGATIVE  mg/dL


 


Urine Urobilinogen    0.2  0.2-1.0  mg/dL


 


Urine Leukocyte Esterase


 


 


 


 25 H


 NEGATIVE


Pete/uL


 


Urine RBC    0-1  0-1  /HPF


 


Urine WBC    2-5 H 0-1  /HPF


 


Urine Squamous Epithelial


Cells 


 


 


 RARE 


 0-2  /HPF





 


Urine Bacteria    RARE  None Seen  /HPF


 


Test


 11/26/24


21:05 11/26/24


18:34 11/26/24


18:17 


 Range/Units


 


 


Influenza Type A Antigen


 Negative For


Type A 


 


 


 NEGATIVE  





 


Influenza Type B Antigen


 Negative For


Type B 


 


 


 NEGATIVE  





 


SARS-CoV-2, RNA, NAAT


 NEGATIVE SARS


CoV-2 


 


 


 NEGATIVE  





 


Hemoglobin (Blood Gas)  11.3 L   13.5-17.5  g/dL


 


Sodium (Blood Gas)  139    136-145  MMOL/L


 


Bedside Potassium (Blood Gas)  3.9    3.4-4.5  MMOL/L


 


Bedside Chloride (Blood Gas)  103      MMOL/L


 


Bedside Glucose (Blood Gas)  103 H   65-95  MG/DL


 


Bedside Ionized Calcium


(Blood Gas) 


 1.13 L


 


 


 1.15-1.33


MMOL/L


 


Bedside Lactic Acid (Blood


Gas) 


 1.14 H


 


 


 0.36-0.75


MMOL/L


 


Red Blood Cell Morphology   See comments    


 


Lactic Acid Level   2.0   0.8-2.5  mmol/L


 


Troponin I High Sensitivity   7   4-75  ng/L


 


B-Type Natriuretic Peptide   100   0-100  pg/mL


 


Procalcitonin   < 0.05 L  0.05-0.5  ng/mL











                               Current Medications








 Medications


  (Trade)  Dose


 Ordered  Sig/Christi


 Route


 PRN Reason  Start Time


 Stop Time Status Last Admin


Dose Admin


 


 Acetaminophen


  (TYLenol 325MG


 TAB)  650 mg  Q6H  PRN


 PO


 FEVER/MILD PAIN LEVEL 1-3  11/26/24 23:30


 12/26/24 23:29   





 


 Acetaminophen


  (TYLenol 650MG


 SUPPOSITORY)  650 mg  Q6H  PRN


 RC


 FEVER / MILD PAIN  1-3 IF NPO  11/26/24 23:30


 12/26/24 23:29   





 


 Acetylcysteine


  (MUComyst 20%


 4ML)  400 mg  [id number]


 IH


   11/26/24 22:10


 12/26/24 22:09  11/27/24 10:04


400 MG


 


 Albuterol Sulfate


  (Proventil


 0.083% 2.5mg/3ml)  2.5 mg  [id number]


 IH


   11/26/24 22:00


 12/26/24 21:59  11/27/24 10:04


2.5 MG


 


 Atorvastatin


 Calcium


  (LIPItor 40MG)  40 mg  HS


 PO


   11/27/24 21:00


 12/27/24 20:59   





 


 Docusate Sodium


  (COLace 100MG


 CAP)  100 mg  BID  PRN


 PO


 c  11/26/24 23:30


 12/26/24 23:29   





 


 Enoxaparin Sodium


  (Lovenox)  40 mg  DAILY


 SQ


   11/27/24 09:00


 12/27/24 08:59  11/27/24 09:22


40 MG


 


 Guaifenesin


  (RobiTUSSin


 SUGAR-FREE 100 MG/


 5 ML UDCUP)  400 mg  Q4H  PRN


 PO


 COUGH/COLD SYMPTOMS  11/27/24 05:30


 12/27/24 05:29   





 


 Home Med


  (Home Medication)  Prazosin


 HCl  1 MG  HS


 PO


   11/27/24 21:00


 12/27/24 20:59   





 


 Home Med


  (Home Medication)  Ropinirole


 HCl  4 MG  QIDP  PRN


 PO


 LP  11/27/24 09:30


 12/27/24 09:29   





 


 Home Med


  (Home Medication)  Sertraline


 HCl  25 MG  DAILY


 PO


   11/28/24 09:00


 11/27/24 09:25 DC  





 


 Hydralazine HCl


  (APRESOLine 20MG


 INJ)  10 mg  Q2H  PRN


 IV


 SBP GREATER THAN 160  11/26/24 23:30


 12/26/24 23:29   





 


 Insulin Human


 Regular


  (humuLIN R 100


 UNIT/ML 3ML)  INSULIN


 SLIDING


 SCAL...  ACHS


 SQ


   11/27/24 07:30


 12/27/24 07:29  11/27/24 12:33


3 UNIT


 


 Ipratropium


 Bromide


  (AtrovENT UD)  0.5 mg  [id number]


 IH


   11/26/24 22:00


 12/26/24 21:59  11/27/24 10:04


0.5 MG


 


 Lactulose


  (Constulose 20gm/


 30ml Udcup)  20 gm  Q6H  PRN


 PO


 CONSTIPATION  11/26/24 23:30


 12/26/24 23:29   





 


 Levofloxacin/


 Dextrose  150 ml @ 


 100 mls/hr  Q48H


 IV


   11/28/24 21:00


 12/8/24 20:59   





 


 Magnesium Sulfate  50 ml @ 0


 mls/hr  PROTOCOL


 IV


   11/27/24 05:00


 12/27/24 04:59  11/27/24 06:27


25 MLS/HR


 


 Methylprednisolone


 Sodium Succinate


  (Solu-medROL


 125MG)  60 mg  Q6H


 IVP


   11/27/24 03:00


 12/27/24 02:59  11/27/24 09:20


60 MG


 


 Metolazone


  (zarOXOlyn)  5 mg  Q2D


 PO


   11/27/24 09:00


 11/27/24 09:25 DC  





 


 Mirtazapine


  (REMeron 15 MG


 TAB)  30 mg  HS


 PO


   11/27/24 21:00


 12/27/24 20:59   





 


 Miscellaneous


 Medication


  (Simvastatin


  (Zocor))  40 mg  HS


 PO


   11/27/24 21:00


 11/27/24 08:54 DC  





 


 Montelukast Sodium


  (SinguLAIR)  10 mg  DAILY


 PO


   11/27/24 09:00


 12/27/24 08:59  11/27/24 09:21


10 MG


 


 Ondansetron HCl


  (zoFRAN 4MG INJ)  4 mg  Q6H  PRN


 IVP


 NAUSEA/VOMITING  11/26/24 23:30


 12/26/24 23:29   





 


 Spironolactone


  (Aldactone 25mg)  25 mg  DAILY


 PO


   11/27/24 09:00


 11/27/24 09:25 DC  





 


 Tamsulosin HCl


  (FloMAX)  0.4 mg  HS


 PO


   11/27/24 21:00


 12/27/24 20:59   





 


 Temazepam


  (restORIL 15 MG


 CAP)  15 mg  HS  PRN


 PO


 INSOMNIA/SLEEP  11/26/24 23:30


 12/26/24 23:29   





 


 Tramadol HCl


  (UltRAM)  50 mg  Q6H  PRN


 PO


 PAIN LEVEL 7 TO 10  11/27/24 01:30


 12/2/24 01:29  11/27/24 01:41


50 MG


 


 Venlafaxine HCl


  (EffEXOR 50 mg


 TAB)  150 mg  DAILY


 PO


   11/27/24 09:00


 12/27/24 08:59   














DIAGNOSTICS / RADIOLOGY:


[ ]





ASSESSMENT:


Acute hypoxemic hypercapnic respiratory failure, requiring BiPAP 


COPD exacerbation


Acute complicated cystitis, POA


Chronic bilateral lower extremity venous ulcers


Acute on chronic lower extremity cellulitis


Anemia of chronic disease 


Hypomagnesemia


Hyperglycemia


Obstructive sleep apnea


Morbid obesity, BMI 41.1


Chronic problem list:  HLP, BPH, anemia, chronic back and knee pain, PTSD, 

morbid obesity





PLAN:


-Admit to PCCU with continuous telemetry monitoring


-Monitor respiratory status closely.


-Start BiPAP, titrate oxygen prn to keep Spo2>/+=92%.


-monitor ABGs and chest x-ray


-Albuterol and Atrovent nebulizer treatments scheduled q.4 hours.  


-RT to provide IS and education on use.


-Robitussin DM as needed cough.


-Start montelukast 10 mg p.o. daily.


-start Mucomyst 400 mg IH q.4 hours.


-Solu-Medrol 60 mg IV q.6 hours


-Continue antibiotic therapy:  Levaquin IV (allergies to vancomycin and 

piperacillin,tazobactam)


-Consult pulmonology for acute hypoxemic and hypercapnic respiratory failure, 

COPD exacerbation.  


-Consult infectious disease for acute on chronic/ recurrent lower extremity 

cellulitis.


-p.r.n. medications for: Pain management, fever, nausea, vomiting, constipation,

hypertension


-Glucometer checks AC & HS needed with insulin regular sliding scale coverage as

needed.


-Blood pressure checks every 4 hours and as needed. 


-AM labs: monitor renal and liver function, monitor electrolytes and replace PRN


-GI and DVT prophylaxis


ATTESTATION BY PHYSICIAN


I have seen and examined the patient. I reviewed the documentation, medical 

decision making, and treatment plan as noted by the mid-level provider above. I 

agree with the findings and plan of care.











Akira Jiang IV, MD, KATARZYNA B FNP         Nov 27, 2024 13:26

## 2024-11-27 NOTE — NUR
Magnesium 1.8 mg/dL,  mg/dL

Magnesium 2GM/50 mL administered at rate of 25cc/hr through 20G IV to right forearm per 
magnesium protocol. No insulin coverage needed at this time per insulin sliding scale.

## 2024-11-27 NOTE — NUR
Pain

Called [facility] hospitalist answering service. Patient continues with pain score 8/10 with 
no relief from Morphine 2mg IV. Kareen STOKES called back and was advised of pain 
unresolved. As per Kareen STOKES: Tramadol 50 mg tablet po q6h prn for severe pain. 
Orders noted and carried out.

## 2024-11-27 NOTE — CONS
[facility] CONSULTATION NOTE 





Date Patient Seen: Nov 27, 2024 


Time of Visit: 15:24


Supervising Physician: [Dr. Melton]


Reason for Consultation: [Acute hypoxic, hypercapnic respiratory failure, COPD 

exacerbation]





Primary Care Physician: [[facility]]


Outpatient Specialists: [ ]


Inpatient Consults: [[facility]]





PROBLEM LIST:


Acute hypoxemic hypercapnic respiratory failure, requiring BiPAP 


Acute on chronic diastolic heart failure, POA


COPD exacerbation, POA


Acute complicated cystitis, POA


Chronic bilateral lower extremity venous ulcers


Acute on chronic lower extremity cellulitis


Anemia of chronic disease 


Hypomagnesemia


Hyperglycemia


Hyperlipidemia


Benign prostate hyperplasia


Post traumatic stress disorder


Chronic back pain


Obstructive sleep apnea


Morbid obesity, BMI 41.1


Hx of DVT, R-popliteal vein 5/8/2024





Plan:


Order CT chest w/o contrast


Start lasix 40mg Q8H


Stop solumedrol, start prednisone


Order venous doppler


Restrict fluid intake, daily weights, monitor I&O


Pending repeat echocardiogram


Continue levaquin for now adjust antibiotics as indicated


Order sputum culture, follow blood culture result


Other management per primary





HPI:


[This is an 79-year-old male with a history of diabetes, hypertension, CAD, RA 

and COPD on home oxygen who presents to the ED for evaluation of progressively 

worsening shortness of breath.  [facility] 

advised to come to the ED for treatment of COPD exacerbation.  Patient reports 

cough, congestion and difficulty breathing.  He has a history of chronic 

bilateral lymphedema.  His last echo on 08/2023 showed an EF of 55% with 

otherwise normal findings.  Patient denies fever, chills, body aches or sick 

contacts.  States recently quit smoking about a week ago after he was diagnosed 

with COPD.  No current use of inhalers but does have an oxygen generator and 

portable oxygen at home.  His labs on admission showed WBC of 5, hemoglobin, 

platelets 314.  BNP shows mildly elevated creatinine of 1.3 and hypomagnesemia 

1.6, no other electrolyte derangement.  His ABG is consistent with hypoxia and 

hypercapnia.  His profile was 0.05, troponin seven, BNP was 100 on admission.  

Lactic acid was 2.0 and TSH was 1.33.  Patient was initiated on Levaquin, 

pending blood cultures.  Was initiated on Solu-Medrol 60mg Q6H on admission as 

well as duonebs.  He continues with BiPAP use currently.]





PAST MEDICAL HX:


see above





PAST SURGICAL HX:


noncontributory





SOCIAL HISTORY:


No tobacco, ETOH, or illicit drug use


Coded Allergies:  


     piperacillin (Verified  Allergy, Severe, ANAPHYLAXIS, 3/28/23)


   3/26/2023


     tazobactam (Verified  Allergy, Severe, ANAPHYLAXIS, 3/28/23)


   3/26/2023


     vancomycin (Verified  Allergy, Severe, anaphylaxis, 3/28/23)


   3/26/2023.


     gabapentin (Unverified  Adverse Reaction, Unknown, 10/24/22)


   GI UPSET.


     naproxen (Unverified  Adverse Reaction, Unknown, 10/24/22)


   GI UPSET





REVIEW OF SYSTEMS: 


12 point ROS reviewed with patient. Pertinent positives mentioned above. 

Otherwise negative.





PHYSICAL EXAM: 


GENERAL: alert, weak, awake oriented x 3


HEENT: EOMI, Sclera non icteric, moist mucosa 


NECK: Supple, no JVD, trachea midline 


LUNGS: Clear breath sounds bilaterally. No wheezes


HEART: Regular rate and rhythm. Normal S1 and S2, without murmurs 


ABD: Abdomen soft, nontender. Bowel sounds present


EXT: No clubbing cyanosis or edema


NEURO: Alert and oriented to person, follows commands





                             Vital Signs (last 8hr)








  Date Time  Temp Pulse Resp B/P (MAP) Pulse Ox O2 Delivery O2 Flow Rate FiO2


 


11/27/24 13:52  83 28     40


 


11/27/24 13:47  83 28     


 


11/27/24 11:42 97.5 81 19 126/62 91 Nasal Cannula 5.0 


 


11/27/24 10:06  91 25     


 


11/27/24 10:04  86 25     40


 


11/27/24 08:15     91 Nasal Cannula* 3 32


 


11/27/24 07:48 97.3 83 19 141/56 90 Nasal Cannula 5.0 











LABS:





                                Hematology Labs:








Test


 11/27/24


05:28 11/26/24


18:17 Range/Units


 


 


White Blood Count 4.6 L  4.8-10.8  K/uL


 


Red Blood Count


 3.98 L


 


 4.50-6.20


MIL/uL


 


Hemoglobin 10.7 L  14.0-18.0  g/dL


 


Hematocrit 36.1 L  42-54  %


 


Mean Corpuscular Volume 90.7   79-99  fL


 


Mean Corpuscular Hemoglobin 26.9 L  27.0-33.0  pg


 


Mean Corpuscular Hemoglobin


Concent 29.6 L


 


 32.0-36.0  g/dL





 


Red Cell Distribution Width 16.5 H  11.0-15.5  %


 


Platelet Count 271   130-400  K/uL


 


Mean Platelet Volume 9.1   7.5-10.5  fL


 


Immature Granulocyte % (Auto) 0.2   0-1  %


 


Neutrophils (%) (Auto) 88.5 H  40.0-77.0  %


 


Lymphocytes (%) (Auto) 9.8 L  21.0-51.0  %


 


Monocytes (%) (Auto) 1.3 L  3.0-13.0  %


 


Eosinophils (%) (Auto) 0.2   0.0-8.0  %


 


Basophils (%) (Auto) 0.0   0.0-5.0  %


 


Neutrophils # (Auto) 4.1   1.8-7.7  K/uL


 


Lymphocytes # (Auto) 0.5 L  1.0-4.8  K/uL


 


Monocytes # (Auto) 0.1   0.1-1.0  K/uL


 


Eosinophils # (Auto) 0.01   0.00-0.70  K/uL


 


Basophils # (Auto) 0.00   0.00-0.20  K/uL


 


Absolute Immature Granulocyte


(auto 0.01 


 


 0-1  K/uL





 


Nucleated Red Blood Cells 0.0   0.0-0.19  %


 


White Cell Morphology Comment See comments    


 


Red Blood Cell Morphology  See comments   








                                 Chemistry Labs:








Test


 11/27/24


11:36 11/27/24


05:28 11/26/24


18:17 Range/Units


 


 


Whole Blood Glucose 208 H     MG/DL


 


Sodium Level  141   136-145  mmol/L


 


Potassium Level  4.8   3.5-5.1  mmol/L


 


Chloride Level  105   101-111  mmol/L


 


Carbon Dioxide Level  31   21-32  mmol/L


 


Blood Urea Nitrogen  15   7-18  mg/dL


 


Creatinine  1.2   0.5-1.3  mg/dL


 


Glomerular Filtration Rate


Calc 


 61 


 


 >90  mL/min





 


Random Glucose  172 #H    mg/dL


 


Total Calcium  8.2 L  8.5-10.1  mg/dL


 


Phosphorus Level  3.6   2.5-4.9  mg/dL


 


Magnesium Level


 


 1.80 


 


 1.80-2.40


mg/dL


 


Thyroid Stimulating Hormone


(TSH) 


 1.33 


 


 0.36-3.74


uIU/mL


 


Lactic Acid Level   2.0  0.8-2.5  mmol/L


 


Troponin I High Sensitivity   7  4-75  ng/L


 


B-Type Natriuretic Peptide   100  0-100  pg/mL


 


Procalcitonin   < 0.05 L 0.05-0.5  ng/mL











DIAGNOSTICS / RADIOLOGY RESULTS:


CHEST 1VW





HISTORY: Shortness of breath





COMPARISON: 5/8/2024





FINDINGS: A frontal projection of the chest was obtained. Mild bilateral


pulmonary infiltrates are seen may be related to mild pulmonary vascular


congestion with possible superimposed pneumonitis.  The heart is


borderline enlarged.  Degenerative changes are seen.  No evidence of


aortic calcification is seen.





IMPRESSION:


1. Mild bilateral pulmonary infiltrates are seen may be related to mild


pulmonary vascular congestion with possible superimposed pneumonitis. 








PLAN 





NEURO: 


Minimize central acting medications as possible. 


Maintain fall precautions, adequate lighting during the day





PULMONARY: 


Supplemental 02 as needed. 


Maintain aspiration precautions at all times





CARDIOVASCULAR: 


Follow hemodynamics. 


Vital signs per facility protocol





GI & NUTRITION:


Continue with nutritional support.


Continue stool softeners and laxatives as needed.





KIDNEYS & ELECTROLYTES: 


Strict monitoring of intake, output and overall fluid balance. 


Avoid nephrotoxic medications to the extent possible. 


Medications to be dosed according to renal function.


Monitor electrolytes and replace as needed





ENDOCRINE:


Maintain blood glucose between 100-180 at all times.


Hypoglycemia protocol in place





INFECTIOUS DISEASE: 


Trend temperature, WBC and procalcitonin level


Follow cultures, deescalate antibiotics as soon as possible.


Panculture if new onset fever





ONCOLOGY/HEMATOLOGY/COAGULATION: 


Monitor for s/s of bleeding


Monitor hemoglobin, coagulation studies as needed





SKIN:


Pressure ulcer prevention per facility protocol


Specialty mattress





ORTHO/REHAB:


Continue PT/OT 





Prophylaxis:


Continue GI and DVT prophylaxis





Code Status: 


Full Resuscitation





Disposition:


TBD





Other:


Total patient care time exceeds 35 minutes excluding all procedures.











THUY MEJIA            Nov 27, 2024 15:51

## 2024-11-28 VITALS
SYSTOLIC BLOOD PRESSURE: 144 MMHG | DIASTOLIC BLOOD PRESSURE: 66 MMHG | RESPIRATION RATE: 19 BRPM | TEMPERATURE: 97.8 F | HEART RATE: 81 BPM

## 2024-11-28 VITALS — HEART RATE: 88 BPM | OXYGEN SATURATION: 92 % | RESPIRATION RATE: 18 BRPM

## 2024-11-28 VITALS — OXYGEN SATURATION: 90 %

## 2024-11-28 VITALS
TEMPERATURE: 97.8 F | RESPIRATION RATE: 18 BRPM | SYSTOLIC BLOOD PRESSURE: 123 MMHG | DIASTOLIC BLOOD PRESSURE: 66 MMHG | HEART RATE: 90 BPM

## 2024-11-28 VITALS
HEART RATE: 66 BPM | DIASTOLIC BLOOD PRESSURE: 68 MMHG | SYSTOLIC BLOOD PRESSURE: 123 MMHG | RESPIRATION RATE: 19 BRPM | TEMPERATURE: 97.7 F

## 2024-11-28 VITALS
RESPIRATION RATE: 20 BRPM | HEART RATE: 68 BPM | SYSTOLIC BLOOD PRESSURE: 103 MMHG | TEMPERATURE: 98 F | DIASTOLIC BLOOD PRESSURE: 42 MMHG

## 2024-11-28 VITALS — HEART RATE: 85 BPM | RESPIRATION RATE: 18 BRPM

## 2024-11-28 VITALS
SYSTOLIC BLOOD PRESSURE: 112 MMHG | TEMPERATURE: 97.7 F | DIASTOLIC BLOOD PRESSURE: 54 MMHG | HEART RATE: 76 BPM | RESPIRATION RATE: 19 BRPM

## 2024-11-28 VITALS — HEART RATE: 81 BPM | RESPIRATION RATE: 18 BRPM

## 2024-11-28 VITALS — RESPIRATION RATE: 18 BRPM | HEART RATE: 83 BPM | OXYGEN SATURATION: 92 %

## 2024-11-28 VITALS — RESPIRATION RATE: 18 BRPM | HEART RATE: 80 BPM

## 2024-11-28 VITALS — HEART RATE: 88 BPM | RESPIRATION RATE: 18 BRPM

## 2024-11-28 LAB
ALBUMIN SERPL-MCNC: 2.3 G/DL (ref 3.5–5)
ALT SERPL-CCNC: 18 U/L (ref 12–78)
AST SERPL-CCNC: 11 U/L (ref 10–37)
BASE EXCESS BLDA CALC-SCNC: 4.9 MMOL/L (ref -2–3)
BASOPHILS # BLD AUTO: 0.02 K/UL (ref 0–0.2)
BASOPHILS NFR BLD AUTO: 0.3 % (ref 0–5)
BILIRUB SERPL-MCNC: 0.2 MG/DL (ref 0.2–1)
BUN SERPL-MCNC: 26 MG/DL (ref 7–18)
CHLORIDE SERPL-SCNC: 101 MMOL/L (ref 101–111)
CO2 SERPL-SCNC: 30 MMOL/L (ref 21–32)
CREAT SERPL-MCNC: 1.6 MG/DL (ref 0.5–1.3)
EOSINOPHIL # BLD AUTO: 0 K/UL (ref 0–0.7)
EOSINOPHIL NFR BLD AUTO: 0 % (ref 0–8)
ERYTHROCYTE [DISTWIDTH] IN BLOOD BY AUTOMATED COUNT: 16.5 % (ref 11–15.5)
GAS PNL BLDA: 37 CELSIUS (ref 35.5–37)
GFR SERPL CREATININE-BSD FRML MDRD: 43 ML/MIN (ref 90–?)
GLUCOSE SERPL-MCNC: 162 MG/DL (ref 70–105)
HCO3 BLDA-SCNC: 32 MMOL/L (ref 21–28)
HCT VFR BLD AUTO: 32.6 % (ref 42–54)
IMM GRANULOCYTES # BLD: 0.05 K/UL (ref 0–1)
LYMPHOCYTES # SPEC AUTO: 0.9 K/UL (ref 1–4.8)
LYMPHOCYTES NFR SPEC AUTO: 11.7 % (ref 21–51)
MAGNESIUM SERPL-MCNC: 1.9 MG/DL (ref 1.8–2.4)
MCH RBC QN AUTO: 26.4 PG (ref 27–33)
MCHC RBC AUTO-ENTMCNC: 29.4 G/DL (ref 32–36)
MCV RBC AUTO: 89.6 FL (ref 79–99)
MONOCYTES # BLD AUTO: 0.6 K/UL (ref 0.1–1)
MONOCYTES NFR BLD AUTO: 7.2 % (ref 3–13)
NEUTROPHILS # BLD AUTO: 6.3 K/UL (ref 1.8–7.7)
NEUTROPHILS NFR BLD AUTO: 80.2 % (ref 40–77)
NRBC BLD MANUAL-RTO: 0 % (ref 0–0.19)
PCO2 BLDA: 57 MMHG (ref 35–48)
PH BLDA: 7.37 [PH] (ref 7.35–7.45)
PLATELET # BLD AUTO: 256 K/UL (ref 130–400)
PO2 BLDA: 59.4 MMHG (ref 83–108)
POTASSIUM SERPL-SCNC: 4 MMOL/L (ref 3.5–5.1)
PROT SERPL-MCNC: 6.4 G/DL (ref 6–8.3)
RBC # BLD AUTO: 3.64 MIL/UL (ref 4.5–6.2)
SAO2 % BLDA: 89.4 % (ref 94–98)
SODIUM SERPL-SCNC: 136 MMOL/L (ref 136–145)
VENTILATION MODE VENT: (no result)
WBC # BLD AUTO: 7.8 K/UL (ref 4.8–10.8)

## 2024-11-28 RX ADMIN — THERA TABS SCH TAB: TAB at 08:26

## 2024-11-28 RX ADMIN — SODIUM CHLORIDE SCH MLS/HR: 0.9 INJECTION, SOLUTION INTRAVENOUS at 10:28

## 2024-11-28 NOTE — PN
[facility] PROGRESS NOTE 





Date Patient Seen: Nov 28, 2024 


Time of Visit: 10:07


Supervising Physician: Dr. Melton





Primary Care Physician: [[facility]]


Outpatient Specialists: [ ]


Inpatient Consults: [[facility]]





PROBLEM LIST:


Acute hypoxemic hypercapnic respiratory failure, requiring BiPAP 


Acute on chronic diastolic heart failure, POA- LVEF 55-60%


Acute renal failure from ATN 


COPD exacerbation, POA


Acute complicated cystitis, POA


Chronic bilateral lower extremity venous ulcers


Acute on chronic lower extremity cellulitis


Anemia of chronic disease 


Hypomagnesemia


Hyperglycemia


Hyperlipidemia


Benign prostate hyperplasia


Post traumatic stress disorder


Chronic back pain


Obstructive sleep apnea


Morbid obesity, BMI 41.1


Hx of DVT, R-popliteal vein 5/8/2024





Plan:


Stop solumedrol, start prednisone


Order venous doppler


Restrict fluid intake, daily weights, monitor I&O


Pending repeat echocardiogram


Continue levaquin for now adjust antibiotics as indicated


Order sputum culture, follow blood culture result


Other management per primary





INTERVAL HISTORY:


Patient is awake alert and oriented x3 no acute event overnight.  Patient had 

been hypercapnic but can not tolerated BiPAP.  We will repeat blood gas.  Lab 

this morning with creatinine is up to 1.6 from 1.2.  Potassium is 4.0 bicarb is 

30.  BUN is 26 patient likely is hypovolemic.  We will give him IV fluids for 

today and repeat lab in the morning.  Check MRSA swab.  Continue antibiotic.  

Continue with prednisone.





REVIEW OF SYSTEMS: 


12 point ROS reviewed with patient. Pertinent positives mentioned above. 

Otherwise negative.





PHYSICAL EXAM: 


GENERAL: alert, weak, awake oriented x 3


HEENT: EOMI, Sclera non icteric, moist mucosa 


NECK: Supple, no JVD, trachea midline 


LUNGS: Clear breath sounds bilaterally. No wheezes


HEART: Regular rate and rhythm. Normal S1 and S2, without murmurs 


ABD: Abdomen soft, nontender. Bowel sounds present


EXT: No clubbing cyanosis or edema


NEURO: Alert and oriented to person, follows commands





                             Vital Signs (last 8hr)








  Date Time  Temp Pulse Resp B/P (MAP) Pulse Ox O2 Delivery O2 Flow Rate FiO2


 


11/28/24 08:06 97.7 76 19 112/54 88 Nasal Cannula 5.0 


 


11/28/24 07:31  88 18   N/Cannula Low lpm 3.0 32


 


11/28/24 07:26  88 18     


 


11/28/24 04:00 98.1 68 20 103/42 88 Nasal Cannula 5.0 











LABS:





                                Hematology Labs:








Test


 11/28/24


03:47 11/27/24


05:28 11/26/24


18:17 Range/Units


 


 


White Blood Count 7.8 #   4.8-10.8  K/uL


 


Red Blood Count


 3.64 L


 


 


 4.50-6.20


MIL/uL


 


Hemoglobin 9.6 L   14.0-18.0  g/dL


 


Hematocrit 32.6 L   42-54  %


 


Mean Corpuscular Volume 89.6    79-99  fL


 


Mean Corpuscular Hemoglobin 26.4 L   27.0-33.0  pg


 


Mean Corpuscular Hemoglobin


Concent 29.4 L


 


 


 32.0-36.0  g/dL





 


Red Cell Distribution Width 16.5 H   11.0-15.5  %


 


Platelet Count 256    130-400  K/uL


 


Mean Platelet Volume 9.3    7.5-10.5  fL


 


Immature Granulocyte % (Auto) 0.6    0-1  %


 


Neutrophils (%) (Auto) 80.2 H   40.0-77.0  %


 


Lymphocytes (%) (Auto) 11.7 L   21.0-51.0  %


 


Monocytes (%) (Auto) 7.2    3.0-13.0  %


 


Eosinophils (%) (Auto) 0.0    0.0-8.0  %


 


Basophils (%) (Auto) 0.3    0.0-5.0  %


 


Neutrophils # (Auto) 6.3    1.8-7.7  K/uL


 


Lymphocytes # (Auto) 0.9 L   1.0-4.8  K/uL


 


Monocytes # (Auto) 0.6    0.1-1.0  K/uL


 


Eosinophils # (Auto) 0.00    0.00-0.70  K/uL


 


Basophils # (Auto) 0.02    0.00-0.20  K/uL


 


Absolute Immature Granulocyte


(auto 0.05 


 


 


 0-1  K/uL





 


Nucleated Red Blood Cells 0.0    0.0-0.19  %


 


White Cell Morphology Comment  See comments    


 


Red Blood Cell Morphology   See comments   








                                 Chemistry Labs:








Test


 11/28/24


05:52 11/28/24


03:47 11/27/24


05:28 11/26/24


18:17 Range/Units


 


 


Whole Blood Glucose 140 H      MG/DL


 


Sodium Level  136    136-145  mmol/L


 


Potassium Level  4.0    3.5-5.1  mmol/L


 


Chloride Level  101    101-111  mmol/L


 


Carbon Dioxide Level  30    21-32  mmol/L


 


Blood Urea Nitrogen  26 H   7-18  mg/dL


 


Creatinine  1.6 H   0.5-1.3  mg/dL


 


Glomerular Filtration Rate


Calc 


 43 


 


 


 >90  mL/min





 


Random Glucose  162 H     mg/dL


 


Total Calcium  8.1 L   8.5-10.1  mg/dL


 


Magnesium Level


 


 1.90 


 


 


 1.80-2.40


mg/dL


 


Total Bilirubin  0.2    0.2-1.0  mg/dL


 


Aspartate Amino Transf


(AST/SGOT) 


 11 


 


 


 10-37  U/L





 


Alanine Aminotransferase


(ALT/SGPT) 


 18 


 


 


 12-78  U/L





 


Alkaline Phosphatase  96      U/L


 


Total Protein  6.4    6.0-8.3  g/dL


 


Albumin  2.3 L   3.5-5.0  g/dL


 


Phosphorus Level   3.6   2.5-4.9  mg/dL


 


Thyroid Stimulating Hormone


(TSH) 


 


 1.33 


 


 0.36-3.74


uIU/mL


 


Lactic Acid Level    2.0  0.8-2.5  mmol/L


 


Troponin I High Sensitivity    7  4-75  ng/L


 


B-Type Natriuretic Peptide    100  0-100  pg/mL


 


Procalcitonin    < 0.05 L 0.05-0.5  ng/mL











DIAGNOSTICS / RADIOLOGY RESULTS:


[ ]





PLAN 





NEURO: 


Minimize central acting medications as possible. 


Maintain fall precautions, adequate lighting during the day





PULMONARY: 


Supplemental 02 as needed. 


Maintain aspiration precautions at all times





CARDIOVASCULAR: 


Follow hemodynamics. 


Vital signs per facility protocol





GI & NUTRITION:


Continue with nutritional support.


Continue stool softeners and laxatives as needed.





KIDNEYS & ELECTROLYTES: 


Strict monitoring of intake, output and overall fluid balance. 


Avoid nephrotoxic medications to the extent possible. 


Medications to be dosed according to renal function.


Monitor electrolytes and replace as needed





ENDOCRINE:


Maintain blood glucose between 100-180 at all times.


Hypoglycemia protocol in place





INFECTIOUS DISEASE: 


Trend temperature, WBC and procalcitonin level


Follow cultures, deescalate antibiotics as soon as possible.


Panculture if new onset fever





ONCOLOGY/HEMATOLOGY/COAGULATION: 


Monitor for s/s of bleeding


Monitor hemoglobin, coagulation studies as needed





SKIN:


Pressure ulcer prevention per facility protocol


Specialty mattress





ORTHO/REHAB:


Continue PT/OT 





Prophylaxis:


Continue GI and DVT prophylaxis





Code Status: 


Full Resuscitation





Disposition:


TBD





Other:


Total patient care time exceeds 35 minutes excluding all procedures.











ROGER DEVINE CNP        Nov 28, 2024 10:10

## 2024-11-28 NOTE — [facility]
CT CHEST W/O CONTRAST



HISTORY:   Shortness of breath



COMPARISON: 3/27/2023



TECHNIQUE: Multiple sequential axial images of the chest were obtained

from the thoracic inlet through upper abdomen. Patient was not given

contrast through intravenous route.



FINDINGS: Tiny bilateral pleural effusions are seen.  Mild bilateral

pulmonary infiltrates are seen. A small hiatal hernia is seen. Fatty

changes of the liver are noted. There is no evidence of pneumothorax.

There are normal size mediastinal and hilar lymph nodes. The heart is

not enlarged. Degenerative changes of the thoracolumbar spine are

present.  There is no evidence of adrenal nodule.



IMPRESSION: 

1. Tiny bilateral pleural effusions. Mild bilateral pulmonary

infiltrates.









CT was performed with one or more following dose reduction techniques:

automated exposure control, adjustment of the mA and kv according to

patient's size, or use of a iterative reconstruction technique.

## 2024-11-28 NOTE — PN
CATALYST PROGRESS NOTE








Date of Service: Nov 28, 2024 


Time of Service: 10:20


Attending Dr Feliciano





SUBJECTIVE:


[  Mr. Lozoya is an 79-year-old  male with a history of hypertension, 

CAD, obstructive sleep apnea, COPD, O2 dependent usually on 3 L O2, chronic knee

 and back pain, AAA, and chronic bilateral lower extremity venous ulcers, 

chronic lower extremity cellulitis who presented to [facility] ED via EMS for 

evaluation of shortness of breath.  According to EMS [facility].  The patient reported cough, congestion, and 

some mild difficulty breathing. 


Chest x-ray:  Mild bilateral pulmonary infiltrates are seen may be related to 

mild pulmonary vascular congestion with possible superimposed pneumonitis.  ABGs

:  PH 7.308, pCO2 60, PO2 72.5, bicarbonate 29.5, O2 sats 93.


ED provider request patient be admitted to the hospital with a diagnosis of 

pneumonia, status dermatitis to bilateral lower extremities, and COPD exacer

bation.





11/27 patient was seen by nurse practitioner and physician during rounding in 

room 302 lying in bed.  Patient just underwent 2D echo and at this moment he is 

on 6 L nasal cannula.  RT was called to place patient on a BiPAP due to most 

recent ABG gases abnormal results.  We are pending further 

evaluation/recommendations of pulmonologist, ID and wound.  Patient continues to

 be on levofloxacin methylprednisolone IV ipratropium and albuterol.  We will 

continue to monitor patient in the meantime.  A.m. labs.





11/28 patient was seen by nurse practitioner and physician during rounding in 

room 302.  Patient is pending most recent ABG gases.  Patient is refusing BiPAP 

and at this moment he is on 5 L nasal cannula.  Per RN patient was on BiPAP  

yesterday all day but unfortunately in the evening he already declined BiPAP.  

2D echo showed 55 to 60% EF.  Venous Doppler and chest CT is pending.  Per ID 

patient is on Zyvox and levofloxacin.  Patient also is on Lasix 40 mg Q 8 hours.

  Methylprednisolone will discontinue patient was placed on prednisolone POA.  

Pulmonology and ID is following the patient.  Patient stated he feels much 

better compared to the previous day.  We will continue to monitor patient in the

 meantime.  A.m. labs.  PT working with the patient]





REVIEW OF SYSTEMS


12-point ROS reviewed with patient.  All pertinent positives mentioned above.  

Otherwise negative, noncontributory, or non-pertinent.





PHYSICAL EXAM


GENERAL APPEARANCE:  The patient is awake, alert, and oriented, in no acute 

cardiopulmonary distress.


NEUROLOGICAL:  Cranial nerves II-XII grossly intact.  Motor is 5/5 in bilateral 

upper and lower extremities proximal to distal.  No sensory deficits.


HEENT:  Face is symmetric. Pupils are equal and reactive.  Extraocular movements

are intact.


NECK:  Supple.  No JVD. No thyromegaly. No submental, submandibular, pre-

/postauricular, occipital or supraclavicular lymphadenopathy.


CHEST:  Normal chest expansion. No Telemetry.


LUNGS:  Tachypneic, wheezing, and lungs sounded wet.  Patient had a very 

productive cough, and was spitting phlegm into a napkin while the I assessed 

him.  


CARDIOVASCULAR:  Regular.  S1 and S2 normal.  No appreciable rubs, murmurs or 

gallops. 


ABDOMEN:  Soft, nontender, and nondistended.  There is no rebound, voluntary 

guarding, or rigidity.


:  Deferred. No Antunez.


EXTREMITIES:  Non-edematous and not cyanotic.  No clubbing.  Good capillary 

refill.


SKIN:  No skin breakdown.





                             Vital Signs (last 8hr)








  Date Time  Temp Pulse Resp B/P (MAP) Pulse Ox O2 Delivery O2 Flow Rate FiO2


 


11/28/24 08:06 97.7 76 19 112/54 88 Nasal Cannula 5.0 


 


11/28/24 07:31  88 18   N/Cannula Low lpm 3.0 32


 


11/28/24 07:26  88 18     


 


11/28/24 04:00 98.1 68 20 103/42 88 Nasal Cannula 5.0 











LABS:








                                   Laboratory:








Test


 11/28/24


05:52 11/28/24


03:47 11/27/24


06:55 11/27/24


05:28 Range/Units


 


 


Whole Blood Glucose 140 H      MG/DL


 


White Blood Count  7.8 #   4.8-10.8  K/uL


 


Red Blood Count


 


 3.64 L


 


 


 4.50-6.20


MIL/uL


 


Hemoglobin  9.6 L   14.0-18.0  g/dL


 


Hematocrit  32.6 L   42-54  %


 


Mean Corpuscular Volume  89.6    79-99  fL


 


Mean Corpuscular Hemoglobin  26.4 L   27.0-33.0  pg


 


Mean Corpuscular Hemoglobin


Concent 


 29.4 L


 


 


 32.0-36.0  g/dL





 


Red Cell Distribution Width  16.5 H   11.0-15.5  %


 


Platelet Count  256    130-400  K/uL


 


Mean Platelet Volume  9.3    7.5-10.5  fL


 


Immature Granulocyte % (Auto)  0.6    0-1  %


 


Neutrophils (%) (Auto)  80.2 H   40.0-77.0  %


 


Lymphocytes (%) (Auto)  11.7 L   21.0-51.0  %


 


Monocytes (%) (Auto)  7.2    3.0-13.0  %


 


Eosinophils (%) (Auto)  0.0    0.0-8.0  %


 


Basophils (%) (Auto)  0.3    0.0-5.0  %


 


Neutrophils # (Auto)  6.3    1.8-7.7  K/uL


 


Lymphocytes # (Auto)  0.9 L   1.0-4.8  K/uL


 


Monocytes # (Auto)  0.6    0.1-1.0  K/uL


 


Eosinophils # (Auto)  0.00    0.00-0.70  K/uL


 


Basophils # (Auto)  0.02    0.00-0.20  K/uL


 


Absolute Immature Granulocyte


(auto 


 0.05 


 


 


 0-1  K/uL





 


Nucleated Red Blood Cells  0.0    0.0-0.19  %


 


Sodium Level  136    136-145  mmol/L


 


Potassium Level  4.0    3.5-5.1  mmol/L


 


Chloride Level  101    101-111  mmol/L


 


Carbon Dioxide Level  30    21-32  mmol/L


 


Blood Urea Nitrogen  26 H   7-18  mg/dL


 


Creatinine  1.6 H   0.5-1.3  mg/dL


 


Glomerular Filtration Rate


Calc 


 43 


 


 


 >90  mL/min





 


Random Glucose  162 H     mg/dL


 


Total Calcium  8.1 L   8.5-10.1  mg/dL


 


Magnesium Level


 


 1.90 


 


 


 1.80-2.40


mg/dL


 


Total Bilirubin  0.2    0.2-1.0  mg/dL


 


Aspartate Amino Transf


(AST/SGOT) 


 11 


 


 


 10-37  U/L





 


Alanine Aminotransferase


(ALT/SGPT) 


 18 


 


 


 12-78  U/L





 


Alkaline Phosphatase  96      U/L


 


Total Protein  6.4    6.0-8.3  g/dL


 


Albumin  2.3 L   3.5-5.0  g/dL


 


Blood Gas Specimen Type   Arterial    


 


Arterial Blood pH   7.278 L  7.350-7.450  


 


Arterial Blood Partial


Pressure CO2 


 


 63 *H


 


 35-48  mmHg





 


Arterial Blood Partial


Pressure O2 


 


 67.0 L


 


 83.0-108.0


mmHg


 


Arterial Blood HCO3


 


 


 28.8 H


 


 21.0-28.0


mmol/L


 


Arterial Blood Oxygen


Saturation 


 


 90.4 L


 


 94.0-98.0  %





 


Arterial Blood Base Excess


 


 


 0.3 


 


 -2.0-3.0


mmol/L


 


Blood Gas Temperature


 


 


 37.0 


 


 35.5-37.0


CELSIUS


 


Blood Gas Flow-by


 


 


 5.00 


 


 0.00-15.00


L/min


 


Blood Gas Vent Mode   NC   ROOM AIR  


 


FiO2   40.0    %


 


Blood Gas Specimen Comment   LR LUCY    


 


White Cell Morphology Comment    See comments   


 


Phosphorus Level    3.6  2.5-4.9  mg/dL


 


Thyroid Stimulating Hormone


(TSH) 


 


 


 1.33 


 0.36-3.74


uIU/mL


 


Test


 11/26/24


21:13 11/26/24


21:05 11/26/24


18:34 11/26/24


18:17 Range/Units


 


 


Urine Color YELLOW     YELLOW  


 


Urine Appearance CLEAR     CLEAR  


 


Urine pH 5.0     5.0-8.0  


 


Urine Specific Gravity 1.024     1.001-1.031  


 


Urine Protein 10 H    NEGATIVE  mg/dL


 


Urine Glucose (UA) NEGATIVE     NEGATIVE  mg/dL


 


Urine Ketones 5 H    NEGATIVE  mg/dL


 


Urine Occult Blood NEGATIVE     NEGATIVE  


 


Urine Nitrate NEGATIVE     NEGATIVE  


 


Urine Bilirubin NEGATIVE     NEGATIVE  mg/dL


 


Urine Urobilinogen 0.2     0.2-1.0  mg/dL


 


Urine Leukocyte Esterase


 25 H


 


 


 


 NEGATIVE


Pete/uL


 


Urine RBC 0-1     0-1  /HPF


 


Urine WBC 2-5 H    0-1  /HPF


 


Urine Squamous Epithelial


Cells RARE 


 


 


 


 0-2  /HPF





 


Urine Bacteria RARE     None Seen  /HPF


 


Influenza Type A Antigen


 


 Negative For


Type A 


 


 NEGATIVE  





 


Influenza Type B Antigen


 


 Negative For


Type B 


 


 NEGATIVE  





 


SARS-CoV-2, RNA, NAAT


 


 NEGATIVE SARS


CoV-2 


 


 NEGATIVE  





 


Hemoglobin (Blood Gas)   11.3 L  13.5-17.5  g/dL


 


Sodium (Blood Gas)   139   136-145  MMOL/L


 


Bedside Potassium (Blood Gas)   3.9   3.4-4.5  MMOL/L


 


Bedside Chloride (Blood Gas)   103     MMOL/L


 


Bedside Glucose (Blood Gas)   103 H  65-95  MG/DL


 


Bedside Ionized Calcium


(Blood Gas) 


 


 1.13 L


 


 1.15-1.33


MMOL/L


 


Bedside Lactic Acid (Blood


Gas) 


 


 1.14 H


 


 0.36-0.75


MMOL/L


 


Red Blood Cell Morphology    See comments   


 


Lactic Acid Level    2.0  0.8-2.5  mmol/L


 


Troponin I High Sensitivity    7  4-75  ng/L


 


B-Type Natriuretic Peptide    100  0-100  pg/mL


 


Procalcitonin    < 0.05 L 0.05-0.5  ng/mL











                               Current Medications








 Medications


  (Trade)  Dose


 Ordered  Sig/Christi


 Route


 PRN Reason  Start Time


 Stop Time Status Last Admin


Dose Admin


 


 Acetaminophen


  (TYLenol 325MG


 TAB)  650 mg  Q6H  PRN


 PO


 FEVER/MILD PAIN LEVEL 1-3  11/26/24 23:30


 12/26/24 23:29   





 


 Acetaminophen


  (TYLenol 650MG


 SUPPOSITORY)  650 mg  Q6H  PRN


 RC


 FEVER / MILD PAIN  1-3 IF NPO  11/26/24 23:30


 12/26/24 23:29   





 


 Acetylcysteine


  (MUComyst 20%


 4ML)  400 mg  [id number]


 IH


   11/26/24 22:10


 12/26/24 22:09  11/28/24 07:26


400 MG


 


 Albuterol Sulfate


  (Proventil


 0.083% 2.5mg/3ml)  2.5 mg  [id number]


 IH


   11/26/24 22:00


 12/26/24 21:59  11/28/24 07:26


2.5 MG


 


 Atorvastatin


 Calcium


  (LIPItor 40MG)  40 mg  HS


 PO


   11/27/24 21:00


 12/27/24 20:59  11/27/24 20:54


40 MG


 


 Docusate Sodium


  (COLace 100MG


 CAP)  100 mg  BID  PRN


 PO


 c  11/26/24 23:30


 12/26/24 23:29  11/28/24 08:27


100 MG


 


 Enoxaparin Sodium


  (Lovenox)  40 mg  DAILY


 SQ


   11/27/24 09:00


 12/27/24 08:59  11/28/24 08:26


40 MG


 


 Furosemide


  (LASix 40MG VIAL)  40 mg  Q8H


 IV


   11/27/24 16:00


 12/27/24 15:59  11/28/24 08:03


40 MG


 


 Guaifenesin


  (RobiTUSSin


 SUGAR-FREE 100 MG/


 5 ML UDCUP)  400 mg  Q4H  PRN


 PO


 COUGH/COLD SYMPTOMS  11/27/24 05:30


 12/27/24 05:29  11/28/24 08:26


400 MG


 


 Home Med


  (Home Medication)  Prazosin


 HCl  1 MG  HS


 PO


   11/27/24 21:00


 12/27/24 20:59   





 


 Home Med


  (Home Medication)  Ropinirole


 HCl  4 MG  QIDP  PRN


 PO


 LP  11/27/24 09:30


 12/27/24 09:29   





 


 Home Med


  (Home Medication)  Sertraline


 HCl  25 MG  DAILY


 PO


   11/28/24 09:00


 11/27/24 09:25 DC  





 


 Hydralazine HCl


  (APRESOLine 20MG


 INJ)  10 mg  Q2H  PRN


 IV


 SBP GREATER THAN 160  11/26/24 23:30


 12/26/24 23:29   





 


 Insulin Human


 Regular


  (humuLIN R 100


 UNIT/ML 3ML)  INSULIN


 SLIDING


 SCAL...  ACHS


 SQ


   11/27/24 07:30


 12/27/24 07:29  11/27/24 21:09


4 UNIT


 


 Ipratropium


 Bromide


  (AtrovENT UD)  0.5 mg  [id number]


 IH


   11/26/24 22:00


 12/26/24 21:59  11/28/24 07:26


0.5 MG


 


 Lactulose


  (Constulose 20gm/


 30ml Udcup)  20 gm  Q6H  PRN


 PO


 CONSTIPATION  11/26/24 23:30


 12/26/24 23:29   





 


 Levofloxacin/


 Dextrose  100 ml @ 


 100 mls/hr  Q24H


 IV


   11/27/24 21:00


 12/7/24 20:59  11/27/24 23:58


100 MLS/HR


 


 Levofloxacin/


 Dextrose  150 ml @ 


 100 mls/hr  Q48H


 IV


   11/28/24 21:00


 11/27/24 13:56 DC  





 


 Linezolid  300 ml @ 


 150 mls/hr  Q12H


 IV


   11/27/24 15:00


 12/7/24 14:59  11/28/24 03:07


150 MLS/HR


 


 Magnesium Sulfate  50 ml @ 0


 mls/hr  PROTOCOL


 IV


   11/27/24 05:00


 12/27/24 04:59  11/28/24 08:28


25 MLS/HR


 


 Methylprednisolone


 Sodium Succinate


  (Solu-medROL


 40MG)  60 mg  Q6H


 IVP


   11/27/24 15:00


 11/27/24 15:49 DC  





 


 Methylprednisolone


 Sodium Succinate


  (Solu-medROL


 125MG)  60 mg  Q6H


 IVP


   11/27/24 03:00


 11/27/24 13:27 DC 11/27/24 09:20


60 MG


 


 Metolazone


  (zarOXOlyn)  2.5 mg  BID


 PO


   11/27/24 21:00


 12/27/24 20:59  11/28/24 08:26


2.5 MG


 


 Metolazone


  (zarOXOlyn)  5 mg  Q2D


 PO


   11/27/24 09:00


 11/27/24 09:25 DC  





 


 Mirtazapine


  (REMeron 15 MG


 TAB)  30 mg  HS


 PO


   11/27/24 21:00


 12/27/24 20:59  11/27/24 20:54


30 MG


 


 Miscellaneous


 Medication


  (Simvastatin


  (Zocor))  40 mg  HS


 PO


   11/27/24 21:00


 11/27/24 08:54 DC  





 


 Montelukast Sodium


  (SinguLAIR)  10 mg  DAILY


 PO


   11/27/24 09:00


 12/27/24 08:59  11/28/24 08:26


10 MG


 


 Multivitamins


 Therapeutic


  (Multivitamin


 Tablet)  1 tab  DAILY


 PO


   11/28/24 09:00


 12/28/24 08:59  11/28/24 08:26


1 TAB


 


 Nystatin


  (NystOP 15 GM


 POWDER)  apply to


 abdominal


 fold  TID


 TP


   11/27/24 21:00


 12/27/24 20:59  11/28/24 08:27


1 APPL


 


 Ondansetron HCl


  (zoFRAN  4MG


 TABLET)  4 mg  Q6H  PRN


 PO


 nausea  11/27/24 13:30


 11/27/24 13:27 DC  





 


 Ondansetron HCl


  (zoFRAN 4MG INJ)  4 mg  Q6H  PRN


 IVP


 NAUSEA/VOMITING  11/26/24 23:30


 12/26/24 23:29   





 


 Prednisone


  (deltaSONE/


 oraSONE 20MG TAB)  40 mg  DAILY


 PO


   11/28/24 09:00


 12/1/24 08:59  11/28/24 08:26


40 MG


 


 Sodium Chloride  1,000 ml @ 


 75 mls/hr  [id number]


 IV


   11/28/24 10:30


 11/29/24 10:29   





 


 Spironolactone


  (Aldactone 25mg)  25 mg  DAILY


 PO


   11/27/24 09:00


 11/27/24 09:25 DC  





 


 Tamsulosin HCl


  (FloMAX)  0.4 mg  HS


 PO


   11/27/24 21:00


 12/27/24 20:59  11/27/24 20:54


0.4 MG


 


 Temazepam


  (restORIL 15 MG


 CAP)  15 mg  HS  PRN


 PO


 INSOMNIA/SLEEP  11/26/24 23:30


 12/26/24 23:29   





 


 Tramadol HCl


  (UltRAM)  50 mg  Q6H  PRN


 PO


 PAIN LEVEL 7 TO 10  11/27/24 01:30


 12/2/24 01:29  11/27/24 01:41


50 MG


 


 Venlafaxine HCl


  (EffEXOR 50 mg


 TAB)  150 mg  DAILY


 PO


   11/27/24 09:00


 12/27/24 08:59  11/28/24 08:26


150 MG


 


 Wound Care/


 Dressing Products


  (Venelex


 Ointment)  1 gm  TID


 TP


   11/27/24 21:00


 12/27/24 20:59  11/28/24 08:27


1 GM











DIAGNOSTICS / RADIOLOGY:


[ ]





ASSESSMENT:


Acute hypoxemic hypercapnic respiratory failure, requiring BiPAP 


COPD exacerbation


Acute complicated cystitis, POA


Chronic bilateral lower extremity venous ulcers


Acute on chronic lower extremity cellulitis


Anemia of chronic disease 


Hypomagnesemia


Hyperglycemia


Obstructive sleep apnea


Morbid obesity, BMI 41.1


Chronic problem list:  HLP, BPH, anemia, chronic back and knee pain, PTSD, 

morbid obesity





PLAN:


-Admit to PCCU with continuous telemetry monitoring


-Monitor respiratory status closely.


-Start BiPAP, titrate oxygen prn to keep Spo2>/+=92%.


-monitor ABGs and chest x-ray


-Albuterol and Atrovent nebulizer treatments scheduled q.4 hours.  


-RT to provide IS and education on use.


-Robitussin DM as needed cough.


-Start montelukast 10 mg p.o. daily.


-start Mucomyst 400 mg IH q.4 hours.


-Solu-Medrol 60 mg IV q.6 hours


-Consult pulmonology for acute hypoxemic and hypercapnic respiratory failure, 

COPD exacerbation.  


-Consult infectious disease for acute on chronic/ recurrent lower extremity 

cellulitis.


-p.r.n. medications for: Pain management, fever, nausea, vomiting, constipation,

hypertension


-Glucometer checks AC & HS needed with insulin regular sliding scale coverage as

needed.


-Blood pressure checks every 4 hours and as needed. 


-AM labs: monitor renal and liver function, monitor electrolytes and replace PRN


-GI and DVT prophylaxis


ATTESTATION BY PHYSICIAN


I have seen and examined the patient. I reviewed the documentation, medical 

decision making, and treatment plan as noted by the mid-level provider above. I 

agree with the findings and plan of care.











Rupa Feliciano MD, [facility]         Nov 28, 2024 10:22

## 2024-11-28 NOTE — NUR
MEDS

SHIFT ASSESSMENT DONE, PLEASE REFER TO CHART. DUE MEDS ADMINISTERED, TOLERATED WELL. KEPT 
RESTED AND COMFORTABLE IN BED WITH HOB ELEVATED. CALL LIGHT WITHIN REACH.

## 2024-11-28 NOTE — [facility]MC[facility]
ULTRASOUND VENOUS DOPPLER, BILATERAL LOWER EXTREMITIES



INDICATION: Bilateral lower extremity pain and swelling



TECHNIQUE: Routine grayscale and color Doppler ultrasound of the

bilateral lower extremity veins performed.



COMPARISON: No priors.



FINDINGS: Posterior tibial veins were not well-demonstrated secondary to

overlying bandaging.



The demonstrated veins of the bilateral lower extremity including the

common femoral vein, femoral vein, and popliteal vein are associated

with normal compressibility. 



Normal respiratory variation was identified. 



No evidence for echogenic intraluminal thrombus formation. 



IMPRESSION:



No sonographic evidence for deep venous thrombosis within the bilateral

lower extremity veins.

## 2024-11-28 NOTE — PN
INFECTIOUS DISEASE FOLLOWUP NOTE



DATE OF SERVICE:  11/28/2024



SUBJECTIVE:  The patient is seen and examined at bedside today.  The patient has

no fever, no chills.  No nausea, no vomiting.  No bleeding tendency.  No 

palpitation or orthopnea.  Shortness of breath is better.  The patient is now 

off BiPAP therapy.  No dysuria or hematuria.



PHYSICAL EXAMINATION:

VITAL SIGNS:  Temperature 97.6.

EYES:  No icterus.  Pupils equal and reactive.

HENT:  No oral thrush seen.  Moist oral mucosa.

NECK:  Supple, no JVD or thyromegaly.

LUNGS:  Good air entry.  No rales, no rhonchi.

CARDIOVASCULAR:  S1, S2 regular.  No murmur heard.

ABDOMEN:  Full, soft, nontender.  Bowel sound is present.

CENTRAL NERVOUS SYSTEM:  Awake, alert and oriented x 3, bedbound debility.

SKIN:  No rashes, no itchiness.

LYMPHATIC:  Bilateral inguinal lymphadenopathy.

BACK:  No deformity, no pressure ulcer.

EXTREMITIES:  Small ulcer, blister and cellulitis involving both legs.



ASSESSMENT:  An 79-year-old male with multiple problems which include:

   * Bilateral leg ulcer.

   * Bilateral lower extremity cellulitis.

   * ____ hypoxic respiratory failure.

   * Morbid obesity.

   * Hypertension.

   * Medical noncompliance.



PLAN:

   * Continue wound care.

   * Continue pain management.

   * Continue DVT prophylaxis.

   * Monitor electrolytes.

   * Continue levofloxacin.

   * Continue linezolid.

   * Continue DVT prophylaxis.







DD: 11/28/2024 01:58 PM

DT: 11/28/2024 05:21 PM

TID: [id number] RECEIPT: [id number]

## 2024-11-29 VITALS — HEART RATE: 71 BPM | RESPIRATION RATE: 18 BRPM | OXYGEN SATURATION: 93 %

## 2024-11-29 VITALS
TEMPERATURE: 97.5 F | HEART RATE: 77 BPM | RESPIRATION RATE: 20 BRPM | SYSTOLIC BLOOD PRESSURE: 142 MMHG | DIASTOLIC BLOOD PRESSURE: 74 MMHG

## 2024-11-29 VITALS
HEART RATE: 70 BPM | TEMPERATURE: 97.8 F | DIASTOLIC BLOOD PRESSURE: 65 MMHG | SYSTOLIC BLOOD PRESSURE: 133 MMHG | RESPIRATION RATE: 18 BRPM

## 2024-11-29 VITALS
DIASTOLIC BLOOD PRESSURE: 94 MMHG | HEART RATE: 76 BPM | TEMPERATURE: 97.3 F | RESPIRATION RATE: 20 BRPM | SYSTOLIC BLOOD PRESSURE: 124 MMHG

## 2024-11-29 VITALS — OXYGEN SATURATION: 94 %

## 2024-11-29 VITALS
DIASTOLIC BLOOD PRESSURE: 55 MMHG | TEMPERATURE: 97.5 F | HEART RATE: 86 BPM | SYSTOLIC BLOOD PRESSURE: 131 MMHG | RESPIRATION RATE: 20 BRPM

## 2024-11-29 VITALS — HEART RATE: 77 BPM | OXYGEN SATURATION: 94 % | RESPIRATION RATE: 18 BRPM

## 2024-11-29 VITALS — RESPIRATION RATE: 18 BRPM | HEART RATE: 70 BPM

## 2024-11-29 VITALS — RESPIRATION RATE: 18 BRPM | HEART RATE: 72 BPM

## 2024-11-29 VITALS — RESPIRATION RATE: 18 BRPM | HEART RATE: 76 BPM

## 2024-11-29 VITALS — SYSTOLIC BLOOD PRESSURE: 113 MMHG | TEMPERATURE: 97.3 F | HEART RATE: 84 BPM | RESPIRATION RATE: 18 BRPM

## 2024-11-29 VITALS — HEART RATE: 73 BPM | RESPIRATION RATE: 18 BRPM

## 2024-11-29 VITALS
SYSTOLIC BLOOD PRESSURE: 113 MMHG | HEART RATE: 69 BPM | DIASTOLIC BLOOD PRESSURE: 59 MMHG | RESPIRATION RATE: 20 BRPM | TEMPERATURE: 97.8 F

## 2024-11-29 VITALS — HEART RATE: 86 BPM | RESPIRATION RATE: 18 BRPM

## 2024-11-29 VITALS — RESPIRATION RATE: 18 BRPM | HEART RATE: 71 BPM

## 2024-11-29 VITALS — OXYGEN SATURATION: 97 %

## 2024-11-29 LAB
ALBUMIN SERPL-MCNC: 2.8 G/DL (ref 3.5–5)
ALT SERPL-CCNC: 20 U/L (ref 12–78)
AST SERPL-CCNC: 15 U/L (ref 10–37)
BASOPHILS # BLD AUTO: 0.01 K/UL (ref 0–0.2)
BASOPHILS NFR BLD AUTO: 0.1 % (ref 0–5)
BILIRUB SERPL-MCNC: 0.4 MG/DL (ref 0.2–1)
BNP SERPL-MCNC: 155 PG/ML (ref 0–100)
BUN SERPL-MCNC: 35 MG/DL (ref 7–18)
BUN SERPL-MCNC: 35 MG/DL (ref 7–18)
CHLORIDE SERPL-SCNC: 94 MMOL/L (ref 101–111)
CHLORIDE SERPL-SCNC: 96 MMOL/L (ref 101–111)
CO2 SERPL-SCNC: 37 MMOL/L (ref 21–32)
CO2 SERPL-SCNC: 39 MMOL/L (ref 21–32)
CREAT SERPL-MCNC: 1.6 MG/DL (ref 0.5–1.3)
CREAT SERPL-MCNC: 1.7 MG/DL (ref 0.5–1.3)
EOSINOPHIL # BLD AUTO: 0 K/UL (ref 0–0.7)
EOSINOPHIL NFR BLD AUTO: 0 % (ref 0–8)
ERYTHROCYTE [DISTWIDTH] IN BLOOD BY AUTOMATED COUNT: 16.4 % (ref 11–15.5)
GFR SERPL CREATININE-BSD FRML MDRD: 40 ML/MIN (ref 90–?)
GFR SERPL CREATININE-BSD FRML MDRD: 43 ML/MIN (ref 90–?)
GLUCOSE SERPL-MCNC: 108 MG/DL (ref 70–105)
GLUCOSE SERPL-MCNC: 159 MG/DL (ref 70–105)
HCT VFR BLD AUTO: 34.9 % (ref 42–54)
IMM GRANULOCYTES # BLD: 0.04 K/UL (ref 0–1)
LYMPHOCYTES # SPEC AUTO: 1.1 K/UL (ref 1–4.8)
LYMPHOCYTES NFR SPEC AUTO: 15.2 % (ref 21–51)
MAGNESIUM SERPL-MCNC: 1.9 MG/DL (ref 1.8–2.4)
MCH RBC QN AUTO: 26 PG (ref 27–33)
MCHC RBC AUTO-ENTMCNC: 30.1 G/DL (ref 32–36)
MCV RBC AUTO: 86.4 FL (ref 79–99)
MONOCYTES # BLD AUTO: 0.5 K/UL (ref 0.1–1)
MONOCYTES NFR BLD AUTO: 7.4 % (ref 3–13)
NEUTROPHILS # BLD AUTO: 5.6 K/UL (ref 1.8–7.7)
NEUTROPHILS NFR BLD AUTO: 76.8 % (ref 40–77)
NRBC BLD MANUAL-RTO: 0 % (ref 0–0.19)
PLATELET # BLD AUTO: 276 K/UL (ref 130–400)
POTASSIUM SERPL-SCNC: 3.4 MMOL/L (ref 3.5–5.1)
POTASSIUM SERPL-SCNC: 3.5 MMOL/L (ref 3.5–5.1)
PROT SERPL-MCNC: 6.9 G/DL (ref 6–8.3)
RBC # BLD AUTO: 4.04 MIL/UL (ref 4.5–6.2)
SODIUM SERPL-SCNC: 134 MMOL/L (ref 136–145)
SODIUM SERPL-SCNC: 137 MMOL/L (ref 136–145)
WBC # BLD AUTO: 7.3 K/UL (ref 4.8–10.8)

## 2024-11-29 RX ADMIN — CASTOR OIL AND BALSAM, PERU SCH GM: 788; 87 OINTMENT TOPICAL at 14:00

## 2024-11-29 NOTE — NUR
ROUNDS

PT RESTING WELL IN BED, WENT BACK TO SLEEP. KEPT UNDISTURBED FOR NOW. CALL LIGHT WITHIN 
REACH. FOR MORE CARE.

## 2024-11-29 NOTE — PN
[facility] PROGRESS NOTE








Date of Service: Nov 29, 2024 


Time of Service: 12:25





SUBJECTIVE:


[  Mr. Lozoya is an 79-year-old  male with a history of hypertension, 

CAD, obstructive sleep apnea, COPD, O2 dependent usually on 3 L O2, chronic knee

 and back pain, AAA, and chronic bilateral lower extremity venous ulcers, 

chronic lower extremity cellulitis who presented to [facility] ED via EMS for 

evaluation of shortness of breath.  According to EMS [facility].  The patient reported cough, congestion, and 

some mild difficulty breathing. 


Chest x-ray:  Mild bilateral pulmonary infiltrates are seen may be related to 

mild pulmonary vascular congestion with possible superimposed pneumonitis.  ABGs

:  PH 7.308, pCO2 60, PO2 72.5, bicarbonate 29.5, O2 sats 93.


ED provider request patient be admitted to the hospital with a diagnosis of 

pneumonia, status dermatitis to bilateral lower extremities, and COPD 

exacerbation.





11/27 patient was seen by nurse practitioner and physician during rounding in 

room 302 lying in bed.  Patient just underwent 2D echo and at this moment he is 

on 6 L nasal cannula.  RT was called to place patient on a BiPAP due to most 

recent ABG gases abnormal results.  We are pending further 

evaluation/recommendations of pulmonologist, ID and wound.  Patient continues to

 be on levofloxacin methylprednisolone IV ipratropium and albuterol.  We will 

continue to monitor patient in the meantime.  A.m. labs.





11/28 patient was seen by nurse practitioner and physician during rounding in 

room 302.  Patient is pending most recent ABG gases.  Patient is refusing BiPAP 

and at this moment he is on 5 L nasal cannula.  Per RN patient was on BiPAP  

yesterday all day but unfortunately in the evening he already declined BiPAP.  

2D echo showed 55 to 60% EF.  Venous Doppler and chest CT is pending.  Per ID 

patient is on Zyvox and levofloxacin.  Patient also is on Lasix 40 mg Q 8 hours.

  Methylprednisolone will discontinue patient was placed on prednisolone POA.  

Pulmonology and ID is following the patient.  Patient stated he feels much 

better compared to the previous day.  We will continue to monitor patient in the

 meantime.  A.m. labs.  PT working with the patient]





11/29 patient has been evaluated in room 302, he continues with3 L oxygen 

supplementation with nasal cannula, saturating at 95%.  I reviewed imaging CT c

hest showed tiny bilateral pleural effusion mild pulmonary infiltrates 

bilaterally.  Apparently patient is refusing BiPAP.  Pulmonologist indicated 

that he will probably benefit to trilogy.  At this point, patient will continue 

with IV antibiotics respiratory culture came back Gram-negative rods.  Patient 

also has MRSA to the nares.  Patient continues with prednisone 40 mg p.o. daily 

and Levaquin IV.





REVIEW OF SYSTEMS


12-point ROS reviewed with patient.  All pertinent positives mentioned above.  

Otherwise negative, noncontributory, or non-pertinent.





PHYSICAL EXAM


GENERAL APPEARANCE:  The patient is awake, alert, and oriented, in no acute 

cardiopulmonary distress.


NEUROLOGICAL:  Cranial nerves II-XII grossly intact.  Motor is 5/5 in bilateral 

upper and lower extremities proximal to distal.  No sensory deficits.


HEENT:  Face is symmetric. Pupils are equal and reactive.  Extraocular movements

are intact.


NECK:  Supple.  No JVD. No thyromegaly. No submental, submandibular, pre-

/postauricular, occipital or supraclavicular lymphadenopathy.


CHEST:  Normal chest expansion. No Telemetry.


LUNGS:  Tachypneic, wheezing, and lungs sounded wet.  Patient had a very 

productive cough, and was spitting phlegm into a napkin while the I assessed 

him.  


CARDIOVASCULAR:  Regular.  S1 and S2 normal.  No appreciable rubs, murmurs or 

gallops. 


ABDOMEN:  Soft, nontender, and nondistended.  There is no rebound, voluntary 

guarding, or rigidity.


:  Deferred. No Antunez.


EXTREMITIES:  Non-edematous and not cyanotic.  No clubbing.  Good capillary 

refill.


SKIN:  No skin breakdown.





                             Vital Signs (last 8hr)








  Date Time  Temp Pulse Resp B/P (MAP) Pulse Ox O2 Delivery O2 Flow Rate FiO2


 


11/29/24 12:00 97.9 69 20 113/59 95   


 


11/29/24 10:44  70 18     


 


11/29/24 08:30  77 18   N/Cannula Low lpm 3.0 32


 


11/29/24 08:00 97.3 76 20 124/94 94   


 


11/29/24 07:31  72 18     











LABS:








                                   Laboratory:








Test


 11/29/24


11:03 11/29/24


03:39 11/28/24


10:59 Range/Units


 


 


Whole Blood Glucose 97      MG/DL


 


White Blood Count  7.3   4.8-10.8  K/uL


 


Red Blood Count


 


 4.04 L


 


 4.50-6.20


MIL/uL


 


Hemoglobin  10.5 L  14.0-18.0  g/dL


 


Hematocrit  34.9 L  42-54  %


 


Mean Corpuscular Volume  86.4   79-99  fL


 


Mean Corpuscular Hemoglobin  26.0 L  27.0-33.0  pg


 


Mean Corpuscular Hemoglobin


Concent 


 30.1 L


 


 32.0-36.0  g/dL





 


Red Cell Distribution Width  16.4 H  11.0-15.5  %


 


Platelet Count  276   130-400  K/uL


 


Mean Platelet Volume  8.7   7.5-10.5  fL


 


Immature Granulocyte % (Auto)  0.5   0-1  %


 


Neutrophils (%) (Auto)  76.8   40.0-77.0  %


 


Lymphocytes (%) (Auto)  15.2 L  21.0-51.0  %


 


Monocytes (%) (Auto)  7.4   3.0-13.0  %


 


Eosinophils (%) (Auto)  0.0   0.0-8.0  %


 


Basophils (%) (Auto)  0.1   0.0-5.0  %


 


Neutrophils # (Auto)  5.6   1.8-7.7  K/uL


 


Lymphocytes # (Auto)  1.1   1.0-4.8  K/uL


 


Monocytes # (Auto)  0.5   0.1-1.0  K/uL


 


Eosinophils # (Auto)  0.00   0.00-0.70  K/uL


 


Basophils # (Auto)  0.01   0.00-0.20  K/uL


 


Absolute Immature Granulocyte


(auto 


 0.04 


 


 0-1  K/uL





 


Nucleated Red Blood Cells  0.0   0.0-0.19  %


 


Sodium Level  137   136-145  mmol/L


 


Potassium Level  3.5   3.5-5.1  mmol/L


 


Chloride Level  96 L  101-111  mmol/L


 


Carbon Dioxide Level  39 H  21-32  mmol/L


 


Blood Urea Nitrogen  35 H  7-18  mg/dL


 


Creatinine  1.7 H  0.5-1.3  mg/dL


 


Glomerular Filtration Rate


Calc 


 40 


 


 >90  mL/min





 


Random Glucose  108 H    mg/dL


 


Total Calcium  8.7   8.5-10.1  mg/dL


 


Magnesium Level


 


 1.90 


 


 1.80-2.40


mg/dL


 


Total Bilirubin  0.4 #  0.2-1.0  mg/dL


 


Aspartate Amino Transf


(AST/SGOT) 


 15 


 


 10-37  U/L





 


Alanine Aminotransferase


(ALT/SGPT) 


 20 


 


 12-78  U/L





 


Alkaline Phosphatase  102     U/L


 


B-Type Natriuretic Peptide  155 H  0-100  pg/mL


 


Total Protein  6.9   6.0-8.3  g/dL


 


Albumin  2.8 #L  3.5-5.0  g/dL


 


Blood Gas Specimen Type   Arterial   


 


Arterial Blood pH   7.367  7.350-7.450  


 


Arterial Blood Partial


Pressure CO2 


 


 57 H


 35-48  mmHg





 


Arterial Blood Partial


Pressure O2 


 


 59.4 L


 83.0-108.0


mmHg


 


Arterial Blood HCO3


 


 


 32.0 H


 21.0-28.0


mmol/L


 


Arterial Blood Oxygen


Saturation 


 


 89.4 L


 94.0-98.0  %





 


Arterial Blood Base Excess


 


 


 4.9 H


 -2.0-3.0


mmol/L


 


Blood Gas Temperature


 


 


 37.0 


 35.5-37.0


CELSIUS


 


Blood Gas Flow-by


 


 


 3.00 


 0.00-15.00


L/min


 


Blood Gas Vent Mode   NC  ROOM AIR  


 


FiO2   32.0   %


 


Blood Gas Specimen Comment   LRNATALIA   











                               Current Medications








 Medications


  (Trade)  Dose


 Ordered  Sig/Christi


 Route


 PRN Reason  Start Time


 Stop Time Status Last Admin


Dose Admin


 


 Acetaminophen


  (TYLenol 325MG


 TAB)  650 mg  Q6H  PRN


 PO


 FEVER/MILD PAIN LEVEL 1-3  11/26/24 23:30


 12/26/24 23:29   





 


 Acetaminophen


  (TYLenol 650MG


 SUPPOSITORY)  650 mg  Q6H  PRN


 RC


 FEVER / MILD PAIN  1-3 IF NPO  11/26/24 23:30


 12/26/24 23:29   





 


 Acetylcysteine


  (MUComyst 20%


 4ML)  400 mg  [id number]


 


   11/26/24 22:10


 12/26/24 22:09  11/29/24 10:44


400 MG


 


 Albuterol Sulfate


  (Proventil


 0.083% 2.5mg/3ml)  2.5 mg  [id number]


 IH


   11/26/24 22:00


 12/26/24 21:59  11/29/24 10:44


2.5 MG


 


 Atorvastatin


 Calcium


  (LIPItor 40MG)  40 mg  HS


 PO


   11/27/24 21:00


 12/27/24 20:59  11/28/24 20:08


40 MG


 


 Docusate Sodium


  (COLace 100MG


 CAP)  100 mg  BID  PRN


 PO


 c  11/26/24 23:30


 12/26/24 23:29  11/28/24 08:27


100 MG


 


 Enoxaparin Sodium


  (Lovenox)  40 mg  DAILY


 SQ


   11/27/24 09:00


 12/27/24 08:59  11/29/24 09:34


40 MG


 


 Furosemide


  (LASix 40MG VIAL)  40 mg  Q8H


 IV


   11/27/24 16:00


 11/29/24 09:05 DC 11/28/24 23:36


40 MG


 


 Guaifenesin


  (RobiTUSSin


 SUGAR-FREE 100 MG/


 5 ML UDCUP)  400 mg  Q4H  PRN


 PO


 COUGH/COLD SYMPTOMS  11/27/24 05:30


 12/27/24 05:29  11/28/24 21:12


400 MG


 


 Home Med


  (Home Medication)  Prazosin


 HCl  1 MG  HS


 PO


   11/27/24 21:00


 12/27/24 20:59   





 


 Home Med


  (Home Medication)  Ropinirole


 HCl  4 MG  QIDP  PRN


 PO


 LP  11/27/24 09:30


 12/27/24 09:29   





 


 Home Med


  (Home Medication)  Sertraline


 HCl  25 MG  DAILY


 PO


   11/28/24 09:00


 11/27/24 09:25 DC  





 


 Hydralazine HCl


  (APRESOLine 20MG


 INJ)  10 mg  Q2H  PRN


 IV


 SBP GREATER THAN 160  11/26/24 23:30


 12/26/24 23:29   





 


 Insulin Human


 Regular


  (humuLIN R 100


 UNIT/ML 3ML)  INSULIN


 SLIDING


 SCAL...  ACHS


 SQ


   11/27/24 07:30


 12/27/24 07:29  11/27/24 21:09


4 UNIT


 


 Ipratropium


 Bromide


  (AtrovENT UD)  0.5 mg  [id number]


 IH


   11/26/24 22:00


 12/26/24 21:59  11/29/24 10:44


0.5 MG


 


 Lactulose


  (Constulose 20gm/


 30ml Udcup)  20 gm  Q6H  PRN


 PO


 CONSTIPATION  11/26/24 23:30


 12/26/24 23:29   





 


 Levofloxacin/


 Dextrose  100 ml @ 


 100 mls/hr  Q24H


 IV


   11/27/24 21:00


 12/7/24 20:59  11/28/24 20:07


100 MLS/HR


 


 Levofloxacin/


 Dextrose  150 ml @ 


 100 mls/hr  Q48H


 IV


   11/28/24 21:00


 11/27/24 13:56 DC  





 


 Linezolid  300 ml @ 


 150 mls/hr  Q12H


 IV


   11/27/24 15:00


 12/7/24 14:59  11/29/24 02:41


150 MLS/HR


 


 Magnesium Sulfate  50 ml @ 0


 mls/hr  PROTOCOL


 IV


   11/27/24 05:00


 12/27/24 04:59  11/29/24 06:22


25 MLS/HR


 


 Methylprednisolone


 Sodium Succinate


  (Solu-medROL


 40MG)  60 mg  Q6H


 IVP


   11/27/24 15:00


 11/27/24 15:49 DC  





 


 Methylprednisolone


 Sodium Succinate


  (Solu-medROL


 125MG)  60 mg  Q6H


 IVP


   11/27/24 03:00


 11/27/24 13:27 DC 11/27/24 09:20


60 MG


 


 Metolazone


  (zarOXOlyn)  2.5 mg  BID


 PO


   11/27/24 21:00


 12/27/24 20:59  11/29/24 09:34


2.5 MG


 


 Metolazone


  (zarOXOlyn)  5 mg  Q2D


 PO


   11/27/24 09:00


 11/27/24 09:25 DC  





 


 Mirtazapine


  (REMeron 15 MG


 TAB)  30 mg  HS


 PO


   11/27/24 21:00


 12/27/24 20:59  11/28/24 20:08


30 MG


 


 Miscellaneous


 Medication


  (Simvastatin


  (Zocor))  40 mg  HS


 PO


   11/27/24 21:00


 11/27/24 08:54 DC  





 


 Montelukast Sodium


  (SinguLAIR)  10 mg  DAILY


 PO


   11/27/24 09:00


 12/27/24 08:59  11/29/24 09:33


10 MG


 


 Multivitamins


 Therapeutic


  (Multivitamin


 Tablet)  1 tab  DAILY


 PO


   11/28/24 09:00


 12/28/24 08:59  11/29/24 09:34


1 TAB


 


 Nystatin


  (NystOP 15 GM


 POWDER)  apply to


 abdominal


 fold  TID


 TP


   11/27/24 21:00


 12/27/24 20:59  11/29/24 09:35


1 APPL


 


 Ondansetron HCl


  (zoFRAN  4MG


 TABLET)  4 mg  Q6H  PRN


 PO


 nausea  11/27/24 13:30


 11/27/24 13:27 DC  





 


 Ondansetron HCl


  (zoFRAN 4MG INJ)  4 mg  Q6H  PRN


 IVP


 NAUSEA/VOMITING  11/26/24 23:30


 12/26/24 23:29   





 


 Prednisone


  (deltaSONE/


 oraSONE 20MG TAB)  40 mg  DAILY


 PO


   11/28/24 09:00


 12/1/24 08:59  11/29/24 09:34


40 MG


 


 Sodium Chloride  1,000 ml @ 


 75 mls/hr  [id number]


 IV


   11/28/24 10:30


 11/29/24 10:29 DC 11/28/24 10:28


75 MLS/HR


 


 Spironolactone


  (Aldactone 25mg)  25 mg  DAILY


 PO


   11/27/24 09:00


 11/27/24 09:25 DC  





 


 Tamsulosin HCl


  (FloMAX)  0.4 mg  HS


 PO


   11/27/24 21:00


 12/27/24 20:59  11/28/24 20:08


0.4 MG


 


 Temazepam


  (restORIL 15 MG


 CAP)  15 mg  HS  PRN


 PO


 INSOMNIA/SLEEP  11/26/24 23:30


 12/26/24 23:29  11/28/24 21:11


15 MG


 


 Tramadol HCl


  (UltRAM)  50 mg  Q6H  PRN


 PO


 PAIN LEVEL 7 TO 10  11/27/24 01:30


 12/2/24 01:29  11/28/24 21:11


50 MG


 


 Venlafaxine HCl


  (EffEXOR 50 mg


 TAB)  150 mg  DAILY


 PO


   11/27/24 09:00


 12/27/24 08:59  11/29/24 09:34


150 MG


 


 Wound Care/


 Dressing Products


  (Venelex


 Ointment)  1 gm  TID


 TP


   11/27/24 21:00


 12/27/24 20:59  11/29/24 09:35


1 GM











DIAGNOSTICS / RADIOLOGY:


[ ]





ASSESSMENT:


Acute hypoxemic hypercapnic respiratory failure, requiring BiPAP 


COPD exacerbation


Acute complicated cystitis, POA


Chronic bilateral lower extremity venous ulcers


Acute on chronic lower extremity cellulitis


Anemia of chronic disease 


Hypomagnesemia


Hyperglycemia


Obstructive sleep apnea


Morbid obesity, BMI 41.1


Chronic problem list:  HLP, BPH, anemia, chronic back and knee pain, PTSD, 

morbid obesity





PLAN:


-Admit to PCCU with continuous telemetry monitoring


-Monitor respiratory status closely, continue with3 L nasal cannula


-continue BiPAP, titrate oxygen prn to keep Spo2>/+=92%.


-monitor ABGs and chest x-ray


-Albuterol and Atrovent nebulizer treatments scheduled q.4 hours.  


-RT to provide IS and education on use.


-Robitussin DM as needed cough.


-Start montelukast 10 mg p.o. daily.


-start Mucomyst 400 mg IH q.4 hours.


-patient will continue with steroid currently on prednisone 40 mg p.o. daily


-appreciate recommendations from pulmonologist we will continue to follow


-appreciate ID recommendation we will continue with IV antibiotic


-p.r.n. medications for: Pain management, fever, nausea, vomiting, constipation,

hypertension


-Glucometer checks AC & HS needed with insulin regular sliding scale coverage as

needed.


-Blood pressure checks every 4 hours and as needed. 


-AM labs: monitor renal and liver function, monitor electrolytes and replace PRN


-GI and DVT prophylaxis





Case was seen and examined with Dr. Feliciano, above plan was formulated


ATTESTATION BY PHYSICIAN


I have seen and examined the patient. I reviewed the documentation, medical 

decision making, and treatment plan as noted by the mid-level provider above. I 

agree with the findings and plan of care.











Rupa Feliciano MD, [facility]          Nov 29, 2024 12:28

## 2024-11-29 NOTE — PN
[facility] PROGRESS NOTE 





Date Patient Seen: Nov 29, 2024 


Time of Visit: 09:08


Supervising Physician: Dr. Melo





Primary Care Physician: [[facility]]


Outpatient Specialists: [ ]


Inpatient Consults: [[facility]]





PROBLEM LIST:


Acute hypoxemic hypercapnic respiratory failure, requiring BiPAP 


Acute on chronic diastolic heart failure, POA- LVEF 55-60%


Acute renal failure from ATN 


COPD exacerbation, POA


Acute complicated cystitis, POA


Chronic bilateral lower extremity venous ulcers


Acute on chronic lower extremity cellulitis


Anemia of chronic disease 


Hypomagnesemia


Hyperglycemia


Hyperlipidemia


Benign prostate hyperplasia


Post traumatic stress disorder


Chronic back pain


Obstructive sleep apnea


Morbid obesity, BMI 41.1


Hx of DVT, R-popliteal vein 5/8/2024





Plan:


Prednisone


Order venous doppler


Restrict fluid intake, daily weights, monitor I&O


Pending repeat echocardiogram


Continue levaquin for now adjust antibiotics as indicated


Order sputum culture, follow blood culture result


Other management per primary





INTERVAL HISTORY:


Patient is awake alert and oriented x3 no acute event overnight.  Patient had 

been hypercapnic but can not tolerated BiPAP.  We will repeat blood gas.  Lab 

this morning with creatinine is up to 1.6 from 1.2.  Potassium is 4.0 bicarb is 

30.  BUN is 26 patient likely is hypovolemic.  We will give him IV fluids for 

today and repeat lab in the morning.  Check MRSA swab.  Continue antibiotic.  

Continue with prednisone.





11/29 patient is awake alert and oriented x3 no acute event overnight.  Vital 

signs blood pressure 124/94 heart rate is 76 respiratory rate is 20.  His T-max 

is 97.9 he remains on 5 L nasal cannula with saturation oxygen of 94%.  

Remarkable lab findings with creatinine up to 1.7 from 1.6, patient had voided 

5.7 L with balance-3.5 L. we will hold Lasix given worsening renal function and 

contraction alkalosis with bicarbonate  39.  We will complete NS today.  

Otherwise history repeat blood gas is steady pH 7.36, pCO2 57 bicarb is 32 PO2 

is 59.  Patient has been refusing BiPAP.  continue antibiotic and nebulizer. He 

may be benefited from a trilogy.





REVIEW OF SYSTEMS: 


12 point ROS reviewed with patient. Pertinent positives mentioned above. 

Otherwise negative.





PHYSICAL EXAM: 


GENERAL: alert, weak, awake oriented x 3


HEENT: EOMI, Sclera non icteric, moist mucosa 


NECK: Supple, no JVD, trachea midline 


LUNGS: Clear breath sounds bilaterally. No wheezes


HEART: Regular rate and rhythm. Normal S1 and S2, without murmurs 


ABD: Abdomen soft, nontender. Bowel sounds present


EXT: No clubbing cyanosis or edema


NEURO: Alert and oriented to person, follows commands





                             Vital Signs (last 8hr)








  Date Time  Temp Pulse Resp B/P (MAP) Pulse Ox O2 Delivery O2 Flow Rate FiO2


 


11/29/24 08:00 97.3 76 20 124/94 94   


 


11/29/24 07:31  72 18     


 


11/29/24 04:00 97.9 70 18 133/65 90 Nasal Cannula 5.0 


 


11/29/24 01:46  86 18     











LABS:





                                Hematology Labs:








Test


 11/29/24


03:39 Range/Units


 


 


White Blood Count 7.3  4.8-10.8  K/uL


 


Red Blood Count


 4.04 L


 4.50-6.20


MIL/uL


 


Hemoglobin 10.5 L 14.0-18.0  g/dL


 


Hematocrit 34.9 L 42-54  %


 


Mean Corpuscular Volume 86.4  79-99  fL


 


Mean Corpuscular Hemoglobin 26.0 L 27.0-33.0  pg


 


Mean Corpuscular Hemoglobin


Concent 30.1 L


 32.0-36.0  g/dL





 


Red Cell Distribution Width 16.4 H 11.0-15.5  %


 


Platelet Count 276  130-400  K/uL


 


Mean Platelet Volume 8.7  7.5-10.5  fL


 


Immature Granulocyte % (Auto) 0.5  0-1  %


 


Neutrophils (%) (Auto) 76.8  40.0-77.0  %


 


Lymphocytes (%) (Auto) 15.2 L 21.0-51.0  %


 


Monocytes (%) (Auto) 7.4  3.0-13.0  %


 


Eosinophils (%) (Auto) 0.0  0.0-8.0  %


 


Basophils (%) (Auto) 0.1  0.0-5.0  %


 


Neutrophils # (Auto) 5.6  1.8-7.7  K/uL


 


Lymphocytes # (Auto) 1.1  1.0-4.8  K/uL


 


Monocytes # (Auto) 0.5  0.1-1.0  K/uL


 


Eosinophils # (Auto) 0.00  0.00-0.70  K/uL


 


Basophils # (Auto) 0.01  0.00-0.20  K/uL


 


Absolute Immature Granulocyte


(auto 0.04 


 0-1  K/uL





 


Nucleated Red Blood Cells 0.0  0.0-0.19  %








                                 Chemistry Labs:








Test


 11/29/24


05:49 11/29/24


03:39 Range/Units


 


 


Whole Blood Glucose 138 H    MG/DL


 


Sodium Level  137  136-145  mmol/L


 


Potassium Level  3.5  3.5-5.1  mmol/L


 


Chloride Level  96 L 101-111  mmol/L


 


Carbon Dioxide Level  39 H 21-32  mmol/L


 


Blood Urea Nitrogen  35 H 7-18  mg/dL


 


Creatinine  1.7 H 0.5-1.3  mg/dL


 


Glomerular Filtration Rate


Calc 


 40 


 >90  mL/min





 


Random Glucose  108 H   mg/dL


 


Total Calcium  8.7  8.5-10.1  mg/dL


 


Magnesium Level


 


 1.90 


 1.80-2.40


mg/dL


 


Total Bilirubin  0.4 # 0.2-1.0  mg/dL


 


Aspartate Amino Transf


(AST/SGOT) 


 15 


 10-37  U/L





 


Alanine Aminotransferase


(ALT/SGPT) 


 20 


 12-78  U/L





 


Alkaline Phosphatase  102    U/L


 


B-Type Natriuretic Peptide  155 H 0-100  pg/mL


 


Total Protein  6.9  6.0-8.3  g/dL


 


Albumin  2.8 #L 3.5-5.0  g/dL











DIAGNOSTICS / RADIOLOGY RESULTS:


[ ]





PLAN 





NEURO: 


Minimize central acting medications as possible. 


Maintain fall precautions, adequate lighting during the day





PULMONARY: 


Supplemental 02 as needed. 


Maintain aspiration precautions at all times


Home O2


NIPPV at night








CARDIOVASCULAR: 


Follow hemodynamics. 


Vital signs per facility protocol





GI & NUTRITION:


Continue with nutritional support.


Continue stool softeners and laxatives as needed.





KIDNEYS & ELECTROLYTES: 


Strict monitoring of intake, output and overall fluid balance. 


Avoid nephrotoxic medications to the extent possible. 


Medications to be dosed according to renal function.


Monitor electrolytes and replace as needed





ENDOCRINE:


Maintain blood glucose between 100-180 at all times.


Hypoglycemia protocol in place





INFECTIOUS DISEASE: 


Trend temperature, WBC and procalcitonin level


Follow cultures, deescalate antibiotics as soon as possible.


Panculture if new onset fever





ONCOLOGY/HEMATOLOGY/COAGULATION: 


Monitor for s/s of bleeding


Monitor hemoglobin, coagulation studies as needed





SKIN:


Pressure ulcer prevention per facility protocol


Specialty mattress





ORTHO/REHAB:


Continue PT/OT 





Prophylaxis:


Continue GI and DVT prophylaxis





Code Status: 


Full Resuscitation





Disposition:


TBD





Other:


Total patient care time exceeds 35 minutes excluding all procedures.











SHAYYTATY,FRISTMICHAEL [facility]        Nov 29, 2024 09:10

## 2024-11-29 NOTE — NUR
MEDS

SHIFT ASSESSMENT DONE, PLEASE REFER TO CHART. DUE MEDS ADMINISTERED, TOLERATED WELL. KEPT 
RESTED AND COMFORTABLE IN BED WITH HOB ELEVATED. REITERATED FLUID RESTRICTIONS AS PT IS 
NON-COMPLAINT. CALL LIGHT WITHIN REACH.

## 2024-11-29 NOTE — PN
INFECTIOUS DISEASE  PROGRESS NOTE








Date of Service: Nov 29, 2024





SUBJECTIVE:


This is an 79-year-old male patient with past medical history of COPD with 

oxygen dependent and chronic bilateral lower extremities venous ulcers who was 

admitted to the hospital with chief complaint of shortness of breaths.


Patient was seen and examined at bedside in room 302.  Patient is awake, alert 

and oriented x3.  Patient is sitting up on the edge of the bed.  Patient 

continues with bilateral lower extremities erythema and edema.  Patient stated 

that he is still drinking water even though he has been restricted fluid intake.

 Patient continues on diuretics.  Nasal swab was positive for methicillin-

resistant Staphylococcus aureus.  No reports yet on the blood culture and sputum

cultures result.  Patient continues on levofloxacin and linezolid.  Remains 

afebrile, temperature is 97.3 and a WBC of 7.3.  We will continue to monitor 

patient's care.








PHYSICAL EXAM


EYES:  Anicteric.  Pupils equal and reactive.


HENT: No oral thrush seen, moist Oral mucosa.


NECK:  Supple, no JVD or thyromegaly.


LUNGS:  Good air entry.  no rhonchi.  Crackles to bilateral upper lobes. Oxygen 

support.


CARDIOVASCULAR:  S1, S2 regular.  No murmur heard.


ABDOMEN:  Soft, non tender, bowel sounds present, no organomegaly.


CENTRAL NERVOUS SYSTEM:  Awake, alert, oriented x 3.  


SKIN:  No rashes, no swelling.  Bilateral lower extremity erythema.


LYMPHATICS: No peripheral lymphadenopathy.


MUSCULOSKELETAL:  No joint swelling, erythema or tenderness.


EXTREMITIES:  No cyanosis or clubbing.  Bilateral lower extremity edema.


BACK:  No deformity, no pressure ulcer.


GENITOURINARY:  No dysuria or hematuria.








Vital Sign (Last 12 Hours)








 11/28/24 11/29/24 11/29/24 11/29/24





 22:21 00:00 01:46 04:00


 


Temp  97.3  97.9


 


Pulse 80 84 86 70


 


Resp 18 18 18 18


 


B/P (MAP)  113/  133/65


 


Pulse Ox  91  90


 


O2 Delivery  Nasal Cannula  Nasal Cannula


 


O2 Flow Rate  5.0  5.0


 


    





 11/29/24 11/29/24 11/29/24 





 07:31 08:00 08:30 


 


Temp  97.3  


 


Pulse 72 76 77 


 


Resp 18 20 18 


 


B/P (MAP)  124/94  


 


Pulse Ox  94  


 


O2 Delivery   N/Cannula Low lpm 


 


O2 Flow Rate   3.0 


 


FiO2   32 














Intake & Output (last 24hrs)   


 


 11/28/24 11/28/24 11/29/24





 15:00 23:00 07:00


 


Intake Total 800 ml 630.0 ml 762.0 ml


 


Output Total 2350 ml 2250 ml 1100 ml


 


Balance -1550 ml -1620.0 ml -338.0 ml











LABS:








                                   Laboratory:








Test


 11/29/24


05:49 11/29/24


03:39 11/28/24


10:59 Range/Units


 


 


Whole Blood Glucose 138 H     MG/DL


 


White Blood Count  7.3   4.8-10.8  K/uL


 


Red Blood Count


 


 4.04 L


 


 4.50-6.20


MIL/uL


 


Hemoglobin  10.5 L  14.0-18.0  g/dL


 


Hematocrit  34.9 L  42-54  %


 


Mean Corpuscular Volume  86.4   79-99  fL


 


Mean Corpuscular Hemoglobin  26.0 L  27.0-33.0  pg


 


Mean Corpuscular Hemoglobin


Concent 


 30.1 L


 


 32.0-36.0  g/dL





 


Red Cell Distribution Width  16.4 H  11.0-15.5  %


 


Platelet Count  276   130-400  K/uL


 


Mean Platelet Volume  8.7   7.5-10.5  fL


 


Immature Granulocyte % (Auto)  0.5   0-1  %


 


Neutrophils (%) (Auto)  76.8   40.0-77.0  %


 


Lymphocytes (%) (Auto)  15.2 L  21.0-51.0  %


 


Monocytes (%) (Auto)  7.4   3.0-13.0  %


 


Eosinophils (%) (Auto)  0.0   0.0-8.0  %


 


Basophils (%) (Auto)  0.1   0.0-5.0  %


 


Neutrophils # (Auto)  5.6   1.8-7.7  K/uL


 


Lymphocytes # (Auto)  1.1   1.0-4.8  K/uL


 


Monocytes # (Auto)  0.5   0.1-1.0  K/uL


 


Eosinophils # (Auto)  0.00   0.00-0.70  K/uL


 


Basophils # (Auto)  0.01   0.00-0.20  K/uL


 


Absolute Immature Granulocyte


(auto 


 0.04 


 


 0-1  K/uL





 


Nucleated Red Blood Cells  0.0   0.0-0.19  %


 


Sodium Level  137   136-145  mmol/L


 


Potassium Level  3.5   3.5-5.1  mmol/L


 


Chloride Level  96 L  101-111  mmol/L


 


Carbon Dioxide Level  39 H  21-32  mmol/L


 


Blood Urea Nitrogen  35 H  7-18  mg/dL


 


Creatinine  1.7 H  0.5-1.3  mg/dL


 


Glomerular Filtration Rate


Calc 


 40 


 


 >90  mL/min





 


Random Glucose  108 H    mg/dL


 


Total Calcium  8.7   8.5-10.1  mg/dL


 


Magnesium Level


 


 1.90 


 


 1.80-2.40


mg/dL


 


Total Bilirubin  0.4 #  0.2-1.0  mg/dL


 


Aspartate Amino Transf


(AST/SGOT) 


 15 


 


 10-37  U/L





 


Alanine Aminotransferase


(ALT/SGPT) 


 20 


 


 12-78  U/L





 


Alkaline Phosphatase  102     U/L


 


B-Type Natriuretic Peptide  155 H  0-100  pg/mL


 


Total Protein  6.9   6.0-8.3  g/dL


 


Albumin  2.8 #L  3.5-5.0  g/dL


 


Blood Gas Specimen Type   Arterial   


 


Arterial Blood pH   7.367  7.350-7.450  


 


Arterial Blood Partial


Pressure CO2 


 


 57 H


 35-48  mmHg





 


Arterial Blood Partial


Pressure O2 


 


 59.4 L


 83.0-108.0


mmHg


 


Arterial Blood HCO3


 


 


 32.0 H


 21.0-28.0


mmol/L


 


Arterial Blood Oxygen


Saturation 


 


 89.4 L


 94.0-98.0  %





 


Arterial Blood Base Excess


 


 


 4.9 H


 -2.0-3.0


mmol/L


 


Blood Gas Temperature


 


 


 37.0 


 35.5-37.0


CELSIUS


 


Blood Gas Flow-by


 


 


 3.00 


 0.00-15.00


L/min


 


Blood Gas Vent Mode   NC  ROOM AIR  


 


FiO2   32.0   %


 


Blood Gas Specimen Comment   LRNATALIA   














ASSESSMENT:


Hypoxic respiratory failure.


Pneumonia.


Bilateral lower extremity cellulitis.


Bilateral lower extremities chronic leg ulcers.


Medical noncompliance.  


COPD with home O2 dependent.


Morbid obesity.





PLAN:


Continue linezolid.  


Continue levofloxacin.  


Keep lower extremities Elevated.


We will follow up on the cultures.


Continue bronchodilators.


Continue oxygen support.  


Continue diuretics as currently ordered.





This case was reviewed and discussed with my supervising physician and the above

assessment and plan was formulated and agreed upon.


ATTESTATION BY PHYSICIAN


I have seen and examined the patient. I reviewed the documentation, medical dec

ision making, and treatment plan as noted by the mid-level provider above. I 

agree with the findings and plan of care.











MELVIN GARCIA MD, MIRTA L [facility]             Nov 29, 2024 09:51

## 2024-11-30 VITALS
RESPIRATION RATE: 18 BRPM | SYSTOLIC BLOOD PRESSURE: 138 MMHG | HEART RATE: 73 BPM | DIASTOLIC BLOOD PRESSURE: 62 MMHG | TEMPERATURE: 98.3 F

## 2024-11-30 VITALS
SYSTOLIC BLOOD PRESSURE: 136 MMHG | HEART RATE: 76 BPM | OXYGEN SATURATION: 97 % | RESPIRATION RATE: 18 BRPM | DIASTOLIC BLOOD PRESSURE: 70 MMHG | TEMPERATURE: 98.1 F

## 2024-11-30 VITALS — RESPIRATION RATE: 18 BRPM | HEART RATE: 72 BPM | OXYGEN SATURATION: 90 %

## 2024-11-30 VITALS — RESPIRATION RATE: 18 BRPM | HEART RATE: 74 BPM | OXYGEN SATURATION: 92 %

## 2024-11-30 VITALS — HEART RATE: 90 BPM | OXYGEN SATURATION: 94 % | RESPIRATION RATE: 18 BRPM

## 2024-11-30 VITALS — RESPIRATION RATE: 18 BRPM | HEART RATE: 80 BPM

## 2024-11-30 VITALS
TEMPERATURE: 98.1 F | RESPIRATION RATE: 20 BRPM | DIASTOLIC BLOOD PRESSURE: 64 MMHG | HEART RATE: 99 BPM | SYSTOLIC BLOOD PRESSURE: 135 MMHG

## 2024-11-30 VITALS — HEART RATE: 75 BPM | OXYGEN SATURATION: 92 % | RESPIRATION RATE: 18 BRPM

## 2024-11-30 VITALS — HEART RATE: 74 BPM | RESPIRATION RATE: 18 BRPM

## 2024-11-30 VITALS
TEMPERATURE: 97.4 F | DIASTOLIC BLOOD PRESSURE: 61 MMHG | SYSTOLIC BLOOD PRESSURE: 125 MMHG | RESPIRATION RATE: 18 BRPM | HEART RATE: 83 BPM

## 2024-11-30 VITALS
HEART RATE: 69 BPM | SYSTOLIC BLOOD PRESSURE: 114 MMHG | RESPIRATION RATE: 20 BRPM | TEMPERATURE: 97.8 F | DIASTOLIC BLOOD PRESSURE: 53 MMHG

## 2024-11-30 VITALS
HEART RATE: 71 BPM | SYSTOLIC BLOOD PRESSURE: 139 MMHG | DIASTOLIC BLOOD PRESSURE: 64 MMHG | RESPIRATION RATE: 18 BRPM | TEMPERATURE: 98.4 F

## 2024-11-30 VITALS — HEART RATE: 73 BPM | RESPIRATION RATE: 18 BRPM

## 2024-11-30 VITALS — OXYGEN SATURATION: 97 %

## 2024-11-30 LAB
BUN SERPL-MCNC: 30 MG/DL (ref 7–18)
CHLORIDE SERPL-SCNC: 98 MMOL/L (ref 101–111)
CO2 SERPL-SCNC: 38 MMOL/L (ref 21–32)
CREAT SERPL-MCNC: 1.4 MG/DL (ref 0.5–1.3)
ERYTHROCYTE [DISTWIDTH] IN BLOOD BY AUTOMATED COUNT: 16.4 % (ref 11–15.5)
GFR SERPL CREATININE-BSD FRML MDRD: 51 ML/MIN (ref 90–?)
GLUCOSE SERPL-MCNC: 97 MG/DL (ref 70–105)
HCT VFR BLD AUTO: 35.5 % (ref 42–54)
MAGNESIUM SERPL-MCNC: 2.1 MG/DL (ref 1.8–2.4)
MCH RBC QN AUTO: 26.1 PG (ref 27–33)
MCHC RBC AUTO-ENTMCNC: 29.9 G/DL (ref 32–36)
MCV RBC AUTO: 87.4 FL (ref 79–99)
NRBC BLD MANUAL-RTO: 0 % (ref 0–0.19)
PLATELET # BLD AUTO: 278 K/UL (ref 130–400)
POTASSIUM SERPL-SCNC: 3.6 MMOL/L (ref 3.5–5.1)
RBC # BLD AUTO: 4.06 MIL/UL (ref 4.5–6.2)
SODIUM SERPL-SCNC: 140 MMOL/L (ref 136–145)
WBC # BLD AUTO: 7 K/UL (ref 4.8–10.8)

## 2024-11-30 RX ADMIN — CHLORTHALIDONE PRN EACH: 50 TABLET ORAL at 20:15

## 2024-11-30 RX ADMIN — SODIUM CHLORIDE SCH GM: 9 INJECTION, SOLUTION INTRAVENOUS at 11:44

## 2024-11-30 NOTE — NUR
ROUNDS

PT RESTING, FAIRLY ASLEEP AT THIS TIME. NO DISTRESS NOTED. KEPT UNDISTURBED FOR NOW. SALINE 
LOCKED PIV, PATENT. FOR MORE CARE.

## 2024-11-30 NOTE — PN
INFECTIOUS DISEASE  PROGRESS NOTE








Date of Service: Nov 30, 2024





SUBJECTIVE:


This is an 79-year-old male patient with past medical history of COPD with 

oxygen dependent and chronic bilateral lower extremities venous ulcers who was 

admitted to the hospital with chief complaint of shortness of breaths.


Patient was seen and examined at bedside in room 302.  Patient is awake, alert 

and oriented x3.  Patient continue with crackles bilateral upper lobes.  Oxygen 

support via nasal cannula.  The final sputum culture results came back positive 

for Klebsiella pneumoniae and Proteus mirabilis.  Patient has been started on 

meropenem and levofloxacin was discontinued by the critical Care team.  We will 

also continue linezolid.  No reports of fever, temperature is 98.1.  Patient 

continues with bilateral lower extremities erythema and edema. We will continue 

to monitor patient's care.








PHYSICAL EXAM


EYES:  Anicteric.  Pupils equal and reactive.


HENT: No oral thrush seen, moist Oral mucosa.


NECK:  Supple, no JVD or thyromegaly.


LUNGS:  Good air entry.  no rhonchi.  Crackles to bilateral upper lobes. Oxygen 

support.


CARDIOVASCULAR:  S1, S2 regular.  No murmur heard.


ABDOMEN:  Soft, non tender, bowel sounds present, no organomegaly.


CENTRAL NERVOUS SYSTEM:  Awake, alert, oriented x 3.  


SKIN:  No rashes, no swelling.  Bilateral lower extremity erythema.


LYMPHATICS: No peripheral lymphadenopathy.


MUSCULOSKELETAL:  No joint swelling, erythema or tenderness.


EXTREMITIES:  No cyanosis or clubbing.  Bilateral lower extremity edema.


BACK:  No deformity, no pressure ulcer.


GENITOURINARY:  No dysuria or hematuria.








Vital Sign (Last 12 Hours)








 11/30/24 11/30/24 11/30/24 11/30/24





 07:21 07:21 08:00 08:00


 


Temp    98.1


 


Pulse 72 72  76


 


Resp 18 18  18


 


B/P (MAP)    136/70


 


Pulse Ox   97 93


 


O2 Delivery  N/Cannula Low lpm Nasal Cannula* Nasal Cannula


 


O2 Flow Rate  3.0 5 1.0


 


FiO2  32 40 


 


    





 11/30/24 11/30/24 11/30/24 11/30/24





 10:26 10:26 12:00 14:27


 


Temp   98.2 


 


Pulse 75 75 73 74


 


Resp 18 18 18 18


 


B/P (MAP)   138/62 


 


Pulse Ox   91 


 


O2 Delivery N/Cannula Low lpm  Nasal Cannula N/Cannula Low lpm


 


O2 Flow Rate 3.0  2.0 3.0


 


FiO2 32   32


 


    





 11/30/24   





 14:28   


 


Pulse 74   


 


Resp 18   














Intake & Output (last 24hrs)   


 


 11/29/24 11/29/24 11/30/24





 15:00 23:00 07:00


 


Intake Total 240 ml 860.0 ml 440.0 ml


 


Output Total 500 ml 1250 ml 2000 ml


 


Balance -260 ml -390.0 ml -1560.0 ml











LABS:








                                   Laboratory:








Test


 11/30/24


15:57 11/30/24


04:44 11/29/24


03:39 Range/Units


 


 


Whole Blood Glucose 177 #H     MG/DL


 


White Blood Count  7.0   4.8-10.8  K/uL


 


Red Blood Count


 


 4.06 L


 


 4.50-6.20


MIL/uL


 


Hemoglobin  10.6 L  14.0-18.0  g/dL


 


Hematocrit  35.5 L  42-54  %


 


Mean Corpuscular Volume  87.4   79-99  fL


 


Mean Corpuscular Hemoglobin  26.1 L  27.0-33.0  pg


 


Mean Corpuscular Hemoglobin


Concent 


 29.9 L


 


 32.0-36.0  g/dL





 


Red Cell Distribution Width  16.4 H  11.0-15.5  %


 


Platelet Count  278   130-400  K/uL


 


Mean Platelet Volume  8.6   7.5-10.5  fL


 


Nucleated Red Blood Cells  0.0   0.0-0.19  %


 


Sodium Level  140   136-145  mmol/L


 


Potassium Level  3.6   3.5-5.1  mmol/L


 


Chloride Level  98 L  101-111  mmol/L


 


Carbon Dioxide Level  38 H  21-32  mmol/L


 


Blood Urea Nitrogen  30 H  7-18  mg/dL


 


Creatinine  1.4 H  0.5-1.3  mg/dL


 


Glomerular Filtration Rate


Calc 


 51 


 


 >90  mL/min





 


Random Glucose  97     mg/dL


 


Total Calcium  8.5   8.5-10.1  mg/dL


 


Magnesium Level


 


 2.10 


 


 1.80-2.40


mg/dL


 


Immature Granulocyte % (Auto)   0.5  0-1  %


 


Neutrophils (%) (Auto)   76.8  40.0-77.0  %


 


Lymphocytes (%) (Auto)   15.2 L 21.0-51.0  %


 


Monocytes (%) (Auto)   7.4  3.0-13.0  %


 


Eosinophils (%) (Auto)   0.0  0.0-8.0  %


 


Basophils (%) (Auto)   0.1  0.0-5.0  %


 


Neutrophils # (Auto)   5.6  1.8-7.7  K/uL


 


Lymphocytes # (Auto)   1.1  1.0-4.8  K/uL


 


Monocytes # (Auto)   0.5  0.1-1.0  K/uL


 


Eosinophils # (Auto)   0.00  0.00-0.70  K/uL


 


Basophils # (Auto)   0.01  0.00-0.20  K/uL


 


Absolute Immature Granulocyte


(auto 


 


 0.04 


 0-1  K/uL





 


Total Bilirubin   0.4 # 0.2-1.0  mg/dL


 


Aspartate Amino Transf


(AST/SGOT) 


 


 15 


 10-37  U/L





 


Alanine Aminotransferase


(ALT/SGPT) 


 


 20 


 12-78  U/L





 


Alkaline Phosphatase   102    U/L


 


B-Type Natriuretic Peptide   155 H 0-100  pg/mL


 


Total Protein   6.9  6.0-8.3  g/dL


 


Albumin   2.8 #L 3.5-5.0  g/dL














ASSESSMENT:


Hypoxic respiratory failure.


Gram-negative Pneumonia.


Bilateral lower extremity cellulitis.


Bilateral lower extremities chronic leg ulcers.


Medical noncompliance.  


COPD with home O2 dependent.


Morbid obesity.





PLAN:


Was started on meropenem.


Continue linezolid.  


levofloxacin was discontinued.  


Keep lower extremities Elevated.


Continue bronchodilators.


Continue oxygen support.  


Continue diuretics as currently ordered.





This case was reviewed and discussed with my supervising physician and the above

assessment and plan was formulated and agreed upon.


ATTESTATION BY PHYSICIAN


I have seen and examined the patient. I reviewed the documentation, medical 

decision making, and treatment plan as noted by the mid-level provider above. I 

agree with the findings and plan of care.











MELVIN GARCIA MD, MIRTA L [facility]             Nov 30, 2024 16:12

## 2024-11-30 NOTE — PN
[facility] PROGRESS NOTE








Date of Service: Nov 30, 2024 


Time of Service: 10:51





SUBJECTIVE:


[  Mr. Lozoya is an 79-year-old  male with a history of hypertension, 

CAD, obstructive sleep apnea, COPD, O2 dependent usually on 3 L O2, chronic knee

 and back pain, AAA, and chronic bilateral lower extremity venous ulcers, 

chronic lower extremity cellulitis who presented to [facility] ED via EMS for 

evaluation of shortness of breath.  According to EMS [facility].  The patient reported cough, congestion, and 

some mild difficulty breathing. 


Chest x-ray:  Mild bilateral pulmonary infiltrates are seen may be related to 

mild pulmonary vascular congestion with possible superimposed pneumonitis.  ABGs

:  PH 7.308, pCO2 60, PO2 72.5, bicarbonate 29.5, O2 sats 93.


ED provider request patient be admitted to the hospital with a diagnosis of 

pneumonia, status dermatitis to bilateral lower extremities, and COPD 

exacerbation.





11/27 patient was seen by nurse practitioner and physician during rounding in 

room 302 lying in bed.  Patient just underwent 2D echo and at this moment he is 

on 6 L nasal cannula.  RT was called to place patient on a BiPAP due to most 

recent ABG gases abnormal results.  We are pending further 

evaluation/recommendations of pulmonologist, ID and wound.  Patient continues to

 be on levofloxacin methylprednisolone IV ipratropium and albuterol.  We will 

continue to monitor patient in the meantime.  A.m. labs.





11/28 patient was seen by nurse practitioner and physician during rounding in 

room 302.  Patient is pending most recent ABG gases.  Patient is refusing BiPAP 

and at this moment he is on 5 L nasal cannula.  Per RN patient was on BiPAP  

yesterday all day but unfortunately in the evening he already declined BiPAP.  

2D echo showed 55 to 60% EF.  Venous Doppler and chest CT is pending.  Per ID 

patient is on Zyvox and levofloxacin.  Patient also is on Lasix 40 mg Q 8 hours.

  Methylprednisolone will discontinue patient was placed on prednisolone POA.  

Pulmonology and ID is following the patient.  Patient stated he feels much 

better compared to the previous day.  We will continue to monitor patient in the

 meantime.  A.m. labs.  PT working with the patient]





11/29 patient has been evaluated in room 302, he continues with3 L oxygen 

supplementation with nasal cannula, saturating at 95%.  I reviewed imaging CT c

hest showed tiny bilateral pleural effusion mild pulmonary infiltrates 

bilaterally.  Apparently patient is refusing BiPAP.  Pulmonologist indicated 

that he will probably benefit to trilogy.  At this point, patient will continue 

with IV antibiotics respiratory culture came back Gram-negative rods.  Patient 

also has MRSA to the nares.  Patient continues with prednisone 40 mg p.o. daily 

and Levaquin IV.





11/30 patient was evaluated in the room.  He continues with oxygen 

supplementation at 3 L.  Patient is back at baseline.  Patient did mention that 

he still feels very weak unable to ambulate.  He would like to go to a skilled 

nursing facility to continue with rehabilitation.  We will be consulting case 

management and physical therapist to eval.





REVIEW OF SYSTEMS


12-point ROS reviewed with patient.  All pertinent positives mentioned above.  

Otherwise negative, noncontributory, or non-pertinent.





PHYSICAL EXAM


GENERAL APPEARANCE:  The patient is awake, alert, and oriented, in no acute 

cardiopulmonary distress.


NEUROLOGICAL:  Cranial nerves II-XII grossly intact.  Motor is 5/5 in bilateral 

upper and lower extremities proximal to distal.  No sensory deficits.


HEENT:  Face is symmetric. Pupils are equal and reactive.  Extraocular movements

are intact.


NECK:  Supple.  No JVD. No thyromegaly. No submental, submandibular, pre-/p

ostauricular, occipital or supraclavicular lymphadenopathy.


CHEST:  Normal chest expansion. No Telemetry.


LUNGS:  Tachypneic, wheezing, and lungs sounded wet.  Patient had a very 

productive cough, and was spitting phlegm into a napkin while the I assessed 

him.  


CARDIOVASCULAR:  Regular.  S1 and S2 normal.  No appreciable rubs, murmurs or 

gallops. 


ABDOMEN:  Soft, nontender, and nondistended.  There is no rebound, voluntary 

guarding, or rigidity.


:  Deferred. No Antunez.


EXTREMITIES:  Non-edematous and not cyanotic.  No clubbing.  Good capillary 

refill.


SKIN:  No skin breakdown.





                             Vital Signs (last 8hr)








  Date Time  Temp Pulse Resp B/P (MAP) Pulse Ox O2 Delivery O2 Flow Rate FiO2


 


11/30/24 10:26  75 18   N/Cannula Low lpm 3.0 32


 


11/30/24 08:00 98.1 76 18 136/70 93 Nasal Cannula 1.0 


 


11/30/24 07:21  72 18   N/Cannula Low lpm 3.0 32


 


11/30/24 07:21  72 18     


 


11/30/24 04:00 97.9 69 20 114/53 92 Nasal Cannula 5.0 











LABS:








                                   Laboratory:








Test


 11/30/24


05:36 11/30/24


04:44 11/29/24


03:39 11/28/24


10:59 Range/Units


 


 


Whole Blood Glucose 95       MG/DL


 


White Blood Count  7.0    4.8-10.8  K/uL


 


Red Blood Count


 


 4.06 L


 


 


 4.50-6.20


MIL/uL


 


Hemoglobin  10.6 L   14.0-18.0  g/dL


 


Hematocrit  35.5 L   42-54  %


 


Mean Corpuscular Volume  87.4    79-99  fL


 


Mean Corpuscular Hemoglobin  26.1 L   27.0-33.0  pg


 


Mean Corpuscular Hemoglobin


Concent 


 29.9 L


 


 


 32.0-36.0  g/dL





 


Red Cell Distribution Width  16.4 H   11.0-15.5  %


 


Platelet Count  278    130-400  K/uL


 


Mean Platelet Volume  8.6    7.5-10.5  fL


 


Nucleated Red Blood Cells  0.0    0.0-0.19  %


 


Sodium Level  140    136-145  mmol/L


 


Potassium Level  3.6    3.5-5.1  mmol/L


 


Chloride Level  98 L   101-111  mmol/L


 


Carbon Dioxide Level  38 H   21-32  mmol/L


 


Blood Urea Nitrogen  30 H   7-18  mg/dL


 


Creatinine  1.4 H   0.5-1.3  mg/dL


 


Glomerular Filtration Rate


Calc 


 51 


 


 


 >90  mL/min





 


Random Glucose  97      mg/dL


 


Total Calcium  8.5    8.5-10.1  mg/dL


 


Magnesium Level


 


 2.10 


 


 


 1.80-2.40


mg/dL


 


Immature Granulocyte % (Auto)   0.5   0-1  %


 


Neutrophils (%) (Auto)   76.8   40.0-77.0  %


 


Lymphocytes (%) (Auto)   15.2 L  21.0-51.0  %


 


Monocytes (%) (Auto)   7.4   3.0-13.0  %


 


Eosinophils (%) (Auto)   0.0   0.0-8.0  %


 


Basophils (%) (Auto)   0.1   0.0-5.0  %


 


Neutrophils # (Auto)   5.6   1.8-7.7  K/uL


 


Lymphocytes # (Auto)   1.1   1.0-4.8  K/uL


 


Monocytes # (Auto)   0.5   0.1-1.0  K/uL


 


Eosinophils # (Auto)   0.00   0.00-0.70  K/uL


 


Basophils # (Auto)   0.01   0.00-0.20  K/uL


 


Absolute Immature Granulocyte


(auto 


 


 0.04 


 


 0-1  K/uL





 


Total Bilirubin   0.4 #  0.2-1.0  mg/dL


 


Aspartate Amino Transf


(AST/SGOT) 


 


 15 


 


 10-37  U/L





 


Alanine Aminotransferase


(ALT/SGPT) 


 


 20 


 


 12-78  U/L





 


Alkaline Phosphatase   102     U/L


 


B-Type Natriuretic Peptide   155 H  0-100  pg/mL


 


Total Protein   6.9   6.0-8.3  g/dL


 


Albumin   2.8 #L  3.5-5.0  g/dL


 


Blood Gas Specimen Type    Arterial   


 


Arterial Blood pH    7.367  7.350-7.450  


 


Arterial Blood Partial


Pressure CO2 


 


 


 57 H


 35-48  mmHg





 


Arterial Blood Partial


Pressure O2 


 


 


 59.4 L


 83.0-108.0


mmHg


 


Arterial Blood HCO3


 


 


 


 32.0 H


 21.0-28.0


mmol/L


 


Arterial Blood Oxygen


Saturation 


 


 


 89.4 L


 94.0-98.0  %





 


Arterial Blood Base Excess


 


 


 


 4.9 H


 -2.0-3.0


mmol/L


 


Blood Gas Temperature


 


 


 


 37.0 


 35.5-37.0


CELSIUS


 


Blood Gas Flow-by


 


 


 


 3.00 


 0.00-15.00


L/min


 


Blood Gas Vent Mode    NC  ROOM AIR  


 


FiO2    32.0   %


 


Blood Gas Specimen Comment    LRNATALIA   











                               Current Medications








 Medications


  (Trade)  Dose


 Ordered  Sig/Christi


 Route


 PRN Reason  Start Time


 Stop Time Status Last Admin


Dose Admin


 


 Acetaminophen


  (TYLenol 325MG


 TAB)  650 mg  Q6H  PRN


 PO


 FEVER/MILD PAIN LEVEL 1-3  11/26/24 23:30


 12/26/24 23:29   





 


 Acetaminophen


  (TYLenol 650MG


 SUPPOSITORY)  650 mg  Q6H  PRN


 RC


 FEVER / MILD PAIN  1-3 IF NPO  11/26/24 23:30


 12/26/24 23:29   





 


 Acetylcysteine


  (MUComyst 20%


 4ML)  400 mg  [id number]


 IH


   11/26/24 22:10


 12/26/24 22:09  11/30/24 10:24


400 MG


 


 Albuterol Sulfate


  (Proventil


 0.083% 2.5mg/3ml)  2.5 mg  [id number]


 IH


   11/26/24 22:00


 12/26/24 21:59  11/30/24 10:24


2.5 MG


 


 Atorvastatin


 Calcium


  (LIPItor 40MG)  40 mg  HS


 PO


   11/27/24 21:00


 12/27/24 20:59  11/29/24 19:44


40 MG


 


 Docusate Sodium


  (COLace 100MG


 CAP)  100 mg  BID  PRN


 PO


 c  11/26/24 23:30


 12/26/24 23:29  11/28/24 08:27


100 MG


 


 Enoxaparin Sodium


  (Lovenox)  40 mg  DAILY


 SQ


   11/27/24 09:00


 12/27/24 08:59  11/30/24 09:01


40 MG


 


 Furosemide


  (LASix 40MG VIAL)  40 mg  Q8H


 IV


   11/27/24 16:00


 11/29/24 09:05 DC 11/28/24 23:36


40 MG


 


 Guaifenesin


  (RobiTUSSin


 SUGAR-FREE 100 MG/


 5 ML UDCUP)  400 mg  Q4H  PRN


 PO


 COUGH/COLD SYMPTOMS  11/27/24 05:30


 12/27/24 05:29  11/28/24 21:12


400 MG


 


 Home Med


  (Home Medication)  Prazosin


 HCl  1 MG  HS


 PO


   11/27/24 21:00


 12/27/24 20:59   





 


 Home Med


  (Home Medication)  Ropinirole


 HCl  4 MG  QIDP  PRN


 PO


 LP  11/27/24 09:30


 12/27/24 09:29   





 


 Home Med


  (Home Medication)  Sertraline


 HCl  25 MG  DAILY


 PO


   11/28/24 09:00


 11/27/24 09:25 DC  





 


 Hydralazine HCl


  (APRESOLine 20MG


 INJ)  10 mg  Q2H  PRN


 IV


 SBP GREATER THAN 160  11/26/24 23:30


 12/26/24 23:29   





 


 Insulin Human


 Regular


  (humuLIN R 100


 UNIT/ML 3ML)  INSULIN


 SLIDING


 SCAL...  ACHS


 SQ


   11/27/24 07:30


 12/27/24 07:29  11/29/24 20:23


4 UNIT


 


 Ipratropium


 Bromide


  (AtrovENT UD)  0.5 mg  [id number]


 IH


   11/26/24 22:00


 12/26/24 21:59  11/30/24 10:24


0.5 MG


 


 Lactulose


  (Constulose 20gm/


 30ml Udcup)  20 gm  Q6H  PRN


 PO


 CONSTIPATION  11/26/24 23:30


 12/26/24 23:29   





 


 Levofloxacin/


 Dextrose  100 ml @ 


 100 mls/hr  Q24H


 IV


   11/27/24 21:00


 11/30/24 09:17 DC 11/29/24 19:43


100 MLS/HR


 


 Levofloxacin/


 Dextrose  150 ml @ 


 100 mls/hr  Q48H


 IV


   11/28/24 21:00


 11/27/24 13:56 DC  





 


 Linezolid  300 ml @ 


 150 mls/hr  Q12H


 IV


   11/27/24 15:00


 12/7/24 14:59  11/30/24 02:02


150 MLS/HR


 


 Magnesium Sulfate  50 ml @ 0


 mls/hr  PROTOCOL


 IV


   11/27/24 05:00


 12/27/24 04:59  11/29/24 06:22


25 MLS/HR


 


 Meropenem


  (Merrem)  1 gm  Q12H


 IVPB


   11/30/24 09:30


 12/10/24 09:29   





 


 Meropenem 1 gm/


 Sodium Chloride  100 ml @ 


 33.333 mls/


 hr  Q8H


 IVPB


   11/30/24 09:30


 11/30/24 09:20 DC  





 


 Methylprednisolone


 Sodium Succinate


  (Solu-medROL


 40MG)  60 mg  Q6H


 IVP


   11/27/24 15:00


 11/27/24 15:49 DC  





 


 Methylprednisolone


 Sodium Succinate


  (Solu-medROL


 125MG)  60 mg  Q6H


 IVP


   11/27/24 03:00


 11/27/24 13:27 DC 11/27/24 09:20


60 MG


 


 Metolazone


  (zarOXOlyn)  2.5 mg  BID


 PO


   11/27/24 21:00


 12/27/24 20:59  11/30/24 09:01


2.5 MG


 


 Metolazone


  (zarOXOlyn)  5 mg  Q2D


 PO


   11/27/24 09:00


 11/27/24 09:25 DC  





 


 Mirtazapine


  (REMeron 15 MG


 TAB)  30 mg  HS


 PO


   11/27/24 21:00


 12/27/24 20:59  11/29/24 19:44


30 MG


 


 Miscellaneous


 Medication


  (Simvastatin


  (Zocor))  40 mg  HS


 PO


   11/27/24 21:00


 11/27/24 08:54 DC  





 


 Montelukast Sodium


  (SinguLAIR)  10 mg  DAILY


 PO


   11/27/24 09:00


 12/27/24 08:59  11/30/24 09:01


10 MG


 


 Multivitamins


 Therapeutic


  (Multivitamin


 Tablet)  1 tab  DAILY


 PO


   11/28/24 09:00


 12/28/24 08:59  11/30/24 09:01


1 TAB


 


 Nystatin


  (NystOP 15 GM


 POWDER)  apply to


 abdominal


 fold  TID


 TP


   11/27/24 21:00


 12/27/24 20:59  11/30/24 09:03


1 APPL


 


 Ondansetron HCl


  (zoFRAN  4MG


 TABLET)  4 mg  Q6H  PRN


 PO


 nausea  11/27/24 13:30


 11/27/24 13:27 DC  





 


 Ondansetron HCl


  (zoFRAN 4MG INJ)  4 mg  Q6H  PRN


 IVP


 NAUSEA/VOMITING  11/26/24 23:30


 12/26/24 23:29   





 


 Prednisone


  (deltaSONE/


 oraSONE 20MG TAB)  40 mg  DAILY


 PO


   11/28/24 09:00


 12/1/24 08:59  11/30/24 09:01


40 MG


 


 Sodium Chloride  1,000 ml @ 


 75 mls/hr  [id number]


 IV


   11/28/24 10:30


 11/29/24 10:29 DC 11/28/24 10:28


75 MLS/HR


 


 Spironolactone


  (Aldactone 25mg)  25 mg  DAILY


 PO


   11/27/24 09:00


 11/27/24 09:25 DC  





 


 Tamsulosin HCl


  (FloMAX)  0.4 mg  HS


 PO


   11/27/24 21:00


 12/27/24 20:59  11/29/24 19:44


0.4 MG


 


 Temazepam


  (restORIL 15 MG


 CAP)  15 mg  HS  PRN


 PO


 INSOMNIA/SLEEP  11/26/24 23:30


 12/26/24 23:29  11/28/24 21:11


15 MG


 


 Tramadol HCl


  (UltRAM)  50 mg  Q6H  PRN


 PO


 PAIN LEVEL 7 TO 10  11/27/24 01:30


 12/2/24 01:29  11/28/24 21:11


50 MG


 


 Venlafaxine HCl


  (EffEXOR 50 mg


 TAB)  150 mg  DAILY


 PO


   11/27/24 09:00


 12/27/24 08:59  11/30/24 09:01


150 MG


 


 Wound Care/


 Dressing Products


  (Venelex


 Ointment)  1 APPL  TID


 TP


   11/29/24 14:00


 12/27/24 20:59  11/30/24 09:03


60 GM


 


 Wound Care/


 Dressing Products


  (Venelex


 Ointment)  1 gm  TID


 TP


   11/27/24 21:00


 11/29/24 12:57 DC 11/29/24 09:35


1 GM











DIAGNOSTICS / RADIOLOGY:


[ ]





ASSESSMENT:


Acute hypoxemic hypercapnic respiratory failure, requiring BiPAP 


COPD exacerbation


Acute complicated cystitis, POA


Chronic bilateral lower extremity venous ulcers


Acute on chronic lower extremity cellulitis


Anemia of chronic disease 


Hypomagnesemia


Hyperglycemia


Obstructive sleep apnea


Morbid obesity, BMI 41.1


Chronic problem list:  HLP, BPH, anemia, chronic back and knee pain, PTSD, 

morbid obesity


Deconditioned


Functional decline





PLAN:


-Admit to PCCU with continuous telemetry monitoring


-Monitor respiratory status closely, continue with3 L nasal cannula


-continue BiPAP, titrate oxygen prn to keep Spo2>/+=92%.


-monitor ABGs and chest x-ray


-Albuterol and Atrovent nebulizer treatments scheduled q.4 hours.  


-RT to provide IS and education on use.


-Robitussin DM as needed cough.


-Start montelukast 10 mg p.o. daily.


-start Mucomyst 400 mg IH q.4 hours.


-patient will continue with steroid currently on prednisone 40 mg p.o. daily


-appreciate recommendations from pulmonologist we will continue to follow


-appreciate ID recommendation we will continue with IV antibiotic


-p.r.n. medications for: Pain management, fever, nausea, vomiting, constipation,

hypertension


-Glucometer checks AC & HS needed with insulin regular sliding scale coverage as

needed.


-Blood pressure checks every 4 hours and as needed. 


-AM labs: monitor renal and liver function, monitor electrolytes and replace PRN


-GI and DVT prophylaxis


-we will request PT eval and treat


-case management for placement








Case was seen and examined with Dr. Feliciano, above plan was formulated


ATTESTATION BY PHYSICIAN


I have seen and examined the patient. I reviewed the documentation, medical 

decision making, and treatment plan as noted by the mid-level provider above. I 

agree with the findings and plan of care.











Rupa Feliciano MD, [facility]          Nov 30, 2024 10:53

## 2024-11-30 NOTE — HMCIMG
CHEST 1VW



HISTORY: Pneumonia



COMPARISON: None



FINDINGS: A frontal projection of the chest was obtained. Mild bilateral

pulmonary infiltrates are seen may be related to mild pulmonary vascular

congestion with possible superimposed pneumonitis.  The heart is

borderline enlarged.  Degenerative changes are seen.  Aortic

calcifications are seen.



IMPRESSION:

1. Mild bilateral pulmonary infiltrates are seen may be related to mild

pulmonary vascular congestion with possible superimposed pneumonitis.

## 2024-11-30 NOTE — PN
[facility] PROGRESS NOTE 





Date Patient Seen: Nov 30, 2024 


Time of Visit: 09:17


Supervising Physician: Dr. Adrien Melo





Primary Care Physician: [[facility]]


Outpatient Specialists: [ ]


Inpatient Consults: [[facility]]





PROBLEM LIST:


Acute hypoxemic hypercapnic respiratory failure, requiring BiPAP 


MDRO multifocal pneumonia- POA- positive for MRSA on nasal swab, sputum cultures

with Klebsiella, proteus, and ESBL


Acute on chronic diastolic heart failure, POA- LVEF 55-60%


Acute renal failure from ATN 


COPD exacerbation, POA


Acute complicated cystitis, POA


Chronic bilateral lower extremity venous ulcers


Acute on chronic lower extremity cellulitis


Anemia of chronic disease 


Hypomagnesemia


Hyperglycemia


Hyperlipidemia


Benign prostate hyperplasia


Post traumatic stress disorder


Chronic back pain


Obstructive sleep apnea


Morbid obesity, BMI 41.1


Hx of DVT, R-popliteal vein 5/8/2024





Plan:


Prednisone


Order venous doppler negative


Restrict fluid intake, daily weights, monitor I&O


Echo with diastolic dysfunction


Adjust antibiotics for MDRO. ID is following.


Order sputum culture, follow blood culture result


Other management per primary





INTERVAL HISTORY:


Patient is awake alert and oriented x3 no acute event overnight.  Patient had 

been hypercapnic but can not tolerated BiPAP.  We will repeat blood gas.  Lab 

this morning with creatinine is up to 1.6 from 1.2.  Potassium is 4.0 bicarb is 

30.  BUN is 26 patient likely is hypovolemic.  We will give him IV fluids for 

today and repeat lab in the morning.  Check MRSA swab.  Continue antibiotic.  

Continue with prednisone.





11/29 patient is awake alert and oriented x3 no acute event overnight.  Vital 

signs blood pressure 124/94 heart rate is 76 respiratory rate is 20.  His T-max 

is 97.9 he remains on 5 L nasal cannula with saturation oxygen of 94%.  Jessicaar

kable lab findings with creatinine up to 1.7 from 1.6, patient had voided 5.7 L 

with balance-3.5 L. we will hold Lasix given worsening renal function and 

contraction alkalosis with bicarbonate  39.  We will complete NS today.  

Otherwise history repeat blood gas is steady pH 7.36, pCO2 57 bicarb is 32 PO2 

is 59.  Patient has been refusing BiPAP.  continue antibiotic and nebulizer. He 

may be benefited from a trilogy. 





11/30 patient is awake alert oriented X 3. no acute event overnight.  Vital 

signs blood pressure 136/70 heart rate is 76 T-max 98.1.  He is on 1 L of oxygen

support 93%.  Obtain chest x-ray this morning.  We had discontinued the IV fluid

yesterday and he put out 3.7 L of urine output with balance-2.2 L. culture of 

the sputum is growing Klebsiella, Proteus mirabilis, and positive MRSA.  

Continue with Zyvox for discontinue Levaquin.  Start patient on meropenem.  We 

will have case management to arrange for snf for antibiotic.





REVIEW OF SYSTEMS: 


12 point ROS reviewed with patient. Pertinent positives mentioned above. 

Otherwise negative.





PHYSICAL EXAM: 


GENERAL: alert, weak, awake oriented x 3


HEENT: EOMI, Sclera non icteric, moist mucosa 


NECK: Supple, no JVD, trachea midline 


LUNGS: Clear breath sounds bilaterally. No wheezes


HEART: Regular rate and rhythm. Normal S1 and S2, without murmurs 


ABD: Abdomen soft, nontender. Bowel sounds present


EXT: No clubbing cyanosis or edema


NEURO: Alert and oriented to person, follows commands





                             Vital Signs (last 8hr)








  Date Time  Temp Pulse Resp B/P (MAP) Pulse Ox O2 Delivery O2 Flow Rate FiO2


 


11/30/24 08:00 98.1 76 18 136/70 93 Nasal Cannula 1.0 


 


11/30/24 07:21  72 18   N/Cannula Low lpm 3.0 32


 


11/30/24 07:21  72 18     


 


11/30/24 04:00 97.9 69 20 114/53 92 Nasal Cannula 5.0 


 


11/30/24 02:09  73 18     











LABS:





                                Hematology Labs:








Test


 11/30/24


04:44 11/29/24


03:39 Range/Units


 


 


White Blood Count 7.0   4.8-10.8  K/uL


 


Red Blood Count


 4.06 L


 


 4.50-6.20


MIL/uL


 


Hemoglobin 10.6 L  14.0-18.0  g/dL


 


Hematocrit 35.5 L  42-54  %


 


Mean Corpuscular Volume 87.4   79-99  fL


 


Mean Corpuscular Hemoglobin 26.1 L  27.0-33.0  pg


 


Mean Corpuscular Hemoglobin


Concent 29.9 L


 


 32.0-36.0  g/dL





 


Red Cell Distribution Width 16.4 H  11.0-15.5  %


 


Platelet Count 278   130-400  K/uL


 


Mean Platelet Volume 8.6   7.5-10.5  fL


 


Nucleated Red Blood Cells 0.0   0.0-0.19  %


 


Immature Granulocyte % (Auto)  0.5  0-1  %


 


Neutrophils (%) (Auto)  76.8  40.0-77.0  %


 


Lymphocytes (%) (Auto)  15.2 L 21.0-51.0  %


 


Monocytes (%) (Auto)  7.4  3.0-13.0  %


 


Eosinophils (%) (Auto)  0.0  0.0-8.0  %


 


Basophils (%) (Auto)  0.1  0.0-5.0  %


 


Neutrophils # (Auto)  5.6  1.8-7.7  K/uL


 


Lymphocytes # (Auto)  1.1  1.0-4.8  K/uL


 


Monocytes # (Auto)  0.5  0.1-1.0  K/uL


 


Eosinophils # (Auto)  0.00  0.00-0.70  K/uL


 


Basophils # (Auto)  0.01  0.00-0.20  K/uL


 


Absolute Immature Granulocyte


(auto 


 0.04 


 0-1  K/uL











                                 Chemistry Labs:








Test


 11/30/24


05:36 11/30/24


04:44 11/29/24


03:39 Range/Units


 


 


Whole Blood Glucose 95      MG/DL


 


Sodium Level  140   136-145  mmol/L


 


Potassium Level  3.6   3.5-5.1  mmol/L


 


Chloride Level  98 L  101-111  mmol/L


 


Carbon Dioxide Level  38 H  21-32  mmol/L


 


Blood Urea Nitrogen  30 H  7-18  mg/dL


 


Creatinine  1.4 H  0.5-1.3  mg/dL


 


Glomerular Filtration Rate


Calc 


 51 


 


 >90  mL/min





 


Random Glucose  97     mg/dL


 


Total Calcium  8.5   8.5-10.1  mg/dL


 


Magnesium Level


 


 2.10 


 


 1.80-2.40


mg/dL


 


Total Bilirubin   0.4 # 0.2-1.0  mg/dL


 


Aspartate Amino Transf


(AST/SGOT) 


 


 15 


 10-37  U/L





 


Alanine Aminotransferase


(ALT/SGPT) 


 


 20 


 12-78  U/L





 


Alkaline Phosphatase   102    U/L


 


B-Type Natriuretic Peptide   155 H 0-100  pg/mL


 


Total Protein   6.9  6.0-8.3  g/dL


 


Albumin   2.8 #L 3.5-5.0  g/dL











DIAGNOSTICS / RADIOLOGY RESULTS:


[ ]





PLAN 





NEURO: 


Minimize central acting medications as possible. 


Maintain fall precautions, adequate lighting during the day





PULMONARY: 


Supplemental 02 as needed. 


Maintain aspiration precautions at all times


Home O2


NIPPV at night








CARDIOVASCULAR: 


Follow hemodynamics. 


Vital signs per facility protocol





GI & NUTRITION:


Continue with nutritional support.


Continue stool softeners and laxatives as needed.





KIDNEYS & ELECTROLYTES: 


Strict monitoring of intake, output and overall fluid balance. 


Avoid nephrotoxic medications to the extent possible. 


Medications to be dosed according to renal function.


Monitor electrolytes and replace as needed





ENDOCRINE:


Maintain blood glucose between 100-180 at all times.


Hypoglycemia protocol in place





INFECTIOUS DISEASE: 


Trend temperature, WBC and procalcitonin level


Follow cultures, deescalate antibiotics as soon as possible.


Panculture if new onset fever





ONCOLOGY/HEMATOLOGY/COAGULATION: 


Monitor for s/s of bleeding


Monitor hemoglobin, coagulation studies as needed





SKIN:


Pressure ulcer prevention per facility protocol


Specialty mattress





ORTHO/REHAB:


Continue PT/OT 





Prophylaxis:


Continue GI and DVT prophylaxis





Code Status: 


Full Resuscitation





Disposition:


TBD





Other:


Total patient care time exceeds 35 minutes excluding all procedures.











ROGER DEVINE [facility]        Nov 30, 2024 09:20

## 2024-12-01 VITALS
SYSTOLIC BLOOD PRESSURE: 120 MMHG | HEART RATE: 87 BPM | TEMPERATURE: 98.3 F | RESPIRATION RATE: 19 BRPM | DIASTOLIC BLOOD PRESSURE: 65 MMHG

## 2024-12-01 VITALS
RESPIRATION RATE: 19 BRPM | HEART RATE: 81 BPM | TEMPERATURE: 97.9 F | SYSTOLIC BLOOD PRESSURE: 128 MMHG | DIASTOLIC BLOOD PRESSURE: 66 MMHG

## 2024-12-01 VITALS
SYSTOLIC BLOOD PRESSURE: 139 MMHG | RESPIRATION RATE: 20 BRPM | TEMPERATURE: 97.4 F | HEART RATE: 113 BPM | DIASTOLIC BLOOD PRESSURE: 60 MMHG

## 2024-12-01 VITALS
SYSTOLIC BLOOD PRESSURE: 110 MMHG | TEMPERATURE: 98.3 F | HEART RATE: 78 BPM | DIASTOLIC BLOOD PRESSURE: 53 MMHG | RESPIRATION RATE: 18 BRPM

## 2024-12-01 VITALS — RESPIRATION RATE: 18 BRPM | HEART RATE: 85 BPM

## 2024-12-01 VITALS — OXYGEN SATURATION: 90 % | HEART RATE: 89 BPM | RESPIRATION RATE: 18 BRPM

## 2024-12-01 VITALS — OXYGEN SATURATION: 95 %

## 2024-12-01 VITALS — OXYGEN SATURATION: 94 % | RESPIRATION RATE: 18 BRPM | HEART RATE: 89 BPM

## 2024-12-01 VITALS
RESPIRATION RATE: 19 BRPM | DIASTOLIC BLOOD PRESSURE: 66 MMHG | TEMPERATURE: 97.8 F | SYSTOLIC BLOOD PRESSURE: 134 MMHG | HEART RATE: 86 BPM

## 2024-12-01 VITALS — HEART RATE: 84 BPM | RESPIRATION RATE: 18 BRPM

## 2024-12-01 VITALS — OXYGEN SATURATION: 91 %

## 2024-12-01 VITALS — RESPIRATION RATE: 18 BRPM | HEART RATE: 86 BPM

## 2024-12-01 VITALS
TEMPERATURE: 97.8 F | HEART RATE: 96 BPM | DIASTOLIC BLOOD PRESSURE: 71 MMHG | RESPIRATION RATE: 18 BRPM | SYSTOLIC BLOOD PRESSURE: 142 MMHG

## 2024-12-01 VITALS
SYSTOLIC BLOOD PRESSURE: 120 MMHG | RESPIRATION RATE: 20 BRPM | DIASTOLIC BLOOD PRESSURE: 58 MMHG | HEART RATE: 88 BPM | TEMPERATURE: 98.6 F

## 2024-12-01 LAB
BUN SERPL-MCNC: 32 MG/DL (ref 7–18)
CHLORIDE SERPL-SCNC: 100 MMOL/L (ref 101–111)
CO2 SERPL-SCNC: 37 MMOL/L (ref 21–32)
CREAT SERPL-MCNC: 1.3 MG/DL (ref 0.5–1.3)
ERYTHROCYTE [DISTWIDTH] IN BLOOD BY AUTOMATED COUNT: 16.6 % (ref 11–15.5)
GFR SERPL CREATININE-BSD FRML MDRD: 56 ML/MIN (ref 90–?)
GLUCOSE SERPL-MCNC: 117 MG/DL (ref 70–105)
HCT VFR BLD AUTO: 36.4 % (ref 42–54)
MCH RBC QN AUTO: 25.8 PG (ref 27–33)
MCHC RBC AUTO-ENTMCNC: 29.7 G/DL (ref 32–36)
MCV RBC AUTO: 86.9 FL (ref 79–99)
NRBC BLD MANUAL-RTO: 0 % (ref 0–0.19)
PLATELET # BLD AUTO: 304 K/UL (ref 130–400)
POTASSIUM SERPL-SCNC: 3.7 MMOL/L (ref 3.5–5.1)
RBC # BLD AUTO: 4.19 MIL/UL (ref 4.5–6.2)
SODIUM SERPL-SCNC: 141 MMOL/L (ref 136–145)
WBC # BLD AUTO: 8.1 K/UL (ref 4.8–10.8)

## 2024-12-01 NOTE — PN
[facility] PROGRESS NOTE








Date of Service: Dec 1, 2024 


Time of Service: 08:08





SUBJECTIVE:


[  


Patient seen and examined in room 302


PCP Dr. Jimenez Kendall


Admitting date 11/26/2024





Mr. Lozoya is an 79-year-old  male with a history of hypertension, CAD,

 obstructive sleep apnea, COPD, O2 dependent usually on 3 L O2, chronic knee and

 back pain, AAA, and chronic bilateral lower extremity venous ulcers, chronic 

lower extremity cellulitis who presented to [facility] ED via EMS for evaluation of 

shortness of breath.  According to EMS [facility].  The patient reported cough, congestion, and some mild 

difficulty breathing. 


Chest x-ray:  Mild bilateral pulmonary infiltrates are seen may be related to 

mild pulmonary vascular congestion with possible superimposed pneumonitis.  ABGs

:  PH 7.308, pCO2 60, PO2 72.5, bicarbonate 29.5, O2 sats 93.


ED provider request patient be admitted to the hospital with a diagnosis of 

pneumonia, status dermatitis to bilateral lower extremities, and COPD 

exacerbation.





11/27 patient was seen by nurse practitioner and physician during rounding in 

room 302 lying in bed.  Patient just underwent 2D echo and at this moment he is 

on 6 L nasal cannula.  RT was called to place patient on a BiPAP due to most 

recent ABG gases abnormal results.  We are pending further evaluation/bety

mmendations of pulmonologist, ID and wound.  Patient continues to be on 

levofloxacin methylprednisolone IV ipratropium and albuterol.  We will continue 

to monitor patient in the meantime.  A.m. labs.





11/28 patient was seen by nurse practitioner and physician during rounding in 

room 302.  Patient is pending most recent ABG gases.  Patient is refusing BiPAP 

and at this moment he is on 5 L nasal cannula.  Per RN patient was on BiPAP  

yesterday all day but unfortunately in the evening he already declined BiPAP.  

2D echo showed 55 to 60% EF.  Venous Doppler and chest CT is pending.  Per ID 

patient is on Zyvox and levofloxacin.  Patient also is on Lasix 40 mg Q 8 hours.

  Methylprednisolone will discontinue patient was placed on prednisolone POA.  

Pulmonology and ID is following the patient.  Patient stated he feels much 

better compared to the previous day.  We will continue to monitor patient in the

 meantime.  A.m. labs.  PT working with the patient]





11/29 patient has been evaluated in room 302, he continues with3 L oxygen 

supplementation with nasal cannula, saturating at 95%.  I reviewed imaging CT 

chest showed tiny bilateral pleural effusion mild pulmonary infiltrates 

bilaterally.  Apparently patient is refusing BiPAP.  Pulmonologist indicated 

that he will probably benefit to trilogy.  At this point, patient will continue 

with IV antibiotics respiratory culture came back Gram-negative rods.  Patient 

also has MRSA to the nares.  Patient continues with prednisone 40 mg p.o. daily 

and Levaquin IV.





11/30 patient was evaluated in the room.  He continues with oxygen 

supplementation at 3 L.  Patient is back at baseline.  Patient did mention that 

he still feels very weak unable to ambulate.  He would like to go to a skilled 

nursing facility to continue with rehabilitation.  We will be consulting case 

management and physical therapist to emerson.





12/1 Today on bedside evaluation patient was found awake alert and oriented x 3.

 Latest vitals are stable, satting 92% on2 L nasal cannula.  Today's labs are 

stable.  Respiratory cultures growing ESBL Klebsiella pneumoniae and Proteus 

mirabilis.  Id continues IV Merrem and linezolid.  Case management coordinating 

for placement.





REVIEW OF SYSTEMS


12-point ROS reviewed with patient.  All pertinent positives mentioned above.  

Otherwise negative, noncontributory, or non-pertinent.





PHYSICAL EXAM


GENERAL APPEARANCE:  The patient is awake, alert, and oriented, in no acute 

cardiopulmonary distress.


NEUROLOGICAL:  Cranial nerves II-XII grossly intact.  Motor is 5/5 in bilateral 

upper and lower extremities proximal to distal.  No sensory deficits.


HEENT:  Face is symmetric. Pupils are equal and reactive.  Extraocular movements

are intact.


NECK:  Supple.  No JVD. No thyromegaly. No submental, submandibular, pre-

/postauricular, occipital or supraclavicular lymphadenopathy.


CHEST:  Normal chest expansion. No Telemetry.


LUNGS:  Tachypneic, wheezing, and lungs sounded wet.  Patient had a very 

productive cough, and was spitting phlegm into a napkin while the I assessed 

him.  


CARDIOVASCULAR:  Regular.  S1 and S2 normal.  No appreciable rubs, murmurs or 

gallops. 


ABDOMEN:  Soft, nontender, and nondistended.  There is no rebound, voluntary 

guarding, or rigidity.


:  Deferred. No Antunez.


EXTREMITIES:  Non-edematous and not cyanotic.  No clubbing.  Good capillary 

refill.


SKIN:  No skin breakdown.





                             Vital Signs (last 8hr)








  Date Time  Temp Pulse Resp B/P (MAP) Pulse Ox O2 Delivery O2 Flow Rate FiO2


 


12/1/24 04:00 98.6 88 20 120/58 92 Nasal Cannula 3.0 


 


12/1/24 02:27  84 18     











LABS:








                                   Laboratory:








Test


 12/1/24


05:19 12/1/24


05:17 11/30/24


04:44 Range/Units


 


 


White Blood Count 8.1    4.8-10.8  K/uL


 


Red Blood Count


 4.19 L


 


 


 4.50-6.20


MIL/uL


 


Hemoglobin 10.8 L   14.0-18.0  g/dL


 


Hematocrit 36.4 L   42-54  %


 


Mean Corpuscular Volume 86.9    79-99  fL


 


Mean Corpuscular Hemoglobin 25.8 L   27.0-33.0  pg


 


Mean Corpuscular Hemoglobin


Concent 29.7 L


 


 


 32.0-36.0  g/dL





 


Red Cell Distribution Width 16.6 H   11.0-15.5  %


 


Platelet Count 304    130-400  K/uL


 


Mean Platelet Volume 8.9    7.5-10.5  fL


 


Nucleated Red Blood Cells 0.0    0.0-0.19  %


 


Sodium Level 141    136-145  mmol/L


 


Potassium Level 3.7    3.5-5.1  mmol/L


 


Chloride Level 100 L   101-111  mmol/L


 


Carbon Dioxide Level 37 H   21-32  mmol/L


 


Blood Urea Nitrogen 32 H   7-18  mg/dL


 


Creatinine 1.3    0.5-1.3  mg/dL


 


Glomerular Filtration Rate


Calc 56 


 


 


 >90  mL/min





 


Random Glucose 117 H     mg/dL


 


Total Calcium 8.3 L   8.5-10.1  mg/dL


 


Whole Blood Glucose  120 H    MG/DL


 


Magnesium Level


 


 


 2.10 


 1.80-2.40


mg/dL











                               Current Medications








 Medications


  (Trade)  Dose


 Ordered  Sig/Christi


 Route


 PRN Reason  Start Time


 Stop Time Status Last Admin


Dose Admin


 


 Acetaminophen


  (TYLenol 325MG


 TAB)  650 mg  Q6H  PRN


 PO


 FEVER/MILD PAIN LEVEL 1-3  11/26/24 23:30


 12/26/24 23:29   





 


 Acetaminophen


  (TYLenol 650MG


 SUPPOSITORY)  650 mg  Q6H  PRN


 RC


 FEVER / MILD PAIN  1-3 IF NPO  11/26/24 23:30


 12/26/24 23:29   





 


 Acetylcysteine


  (MUComyst 20%


 4ML)  400 mg  [id number]


 IH


   11/26/24 22:10


 12/26/24 22:09  12/1/24 02:24


400 MG


 


 Albuterol Sulfate


  (Proventil


 0.083% 2.5mg/3ml)  2.5 mg  [id number]


 IH


   11/26/24 22:00


 12/26/24 21:59  12/1/24 02:24


2.5 MG


 


 Atorvastatin


 Calcium


  (LIPItor 40MG)  40 mg  HS


 PO


   11/27/24 21:00


 12/27/24 20:59  11/30/24 20:12


40 MG


 


 Docusate Sodium


  (COLace 100MG


 CAP)  100 mg  BID  PRN


 PO


 c  11/26/24 23:30


 12/26/24 23:29  11/28/24 08:27


100 MG


 


 Enoxaparin Sodium


  (Lovenox)  40 mg  DAILY


 SQ


   11/27/24 09:00


 12/27/24 08:59  11/30/24 09:01


40 MG


 


 Furosemide


  (LASix 40MG VIAL)  40 mg  Q8H


 IV


   11/27/24 16:00


 11/29/24 09:05 DC 11/28/24 23:36


40 MG


 


 Guaifenesin


  (RobiTUSSin


 SUGAR-FREE 100 MG/


 5 ML UDCUP)  400 mg  Q4H  PRN


 PO


 COUGH/COLD SYMPTOMS  11/27/24 05:30


 12/27/24 05:29  11/28/24 21:12


400 MG


 


 Home Med


  (Home Medication)  Prazosin


 HCl  1 MG  HS


 PO


   11/27/24 21:00


 12/27/24 20:59   





 


 Home Med


  (Home Medication)  Ropinirole


 HCl  4 MG  QIDP  PRN


 PO


 LP  11/27/24 09:30


 12/27/24 09:29   





 


 Home Med


  (Home Medication)  Sertraline


 HCl  25 MG  DAILY


 PO


   11/28/24 09:00


 11/27/24 09:25 DC  





 


 Hydralazine HCl


  (APRESOLine 20MG


 INJ)  10 mg  Q2H  PRN


 IV


 SBP GREATER THAN 160  11/26/24 23:30


 12/26/24 23:29   





 


 Insulin Human


 Regular


  (humuLIN R 100


 UNIT/ML 3ML)  INSULIN


 SLIDING


 SCAL...  ACHS


 SQ


   11/27/24 07:30


 12/27/24 07:29  11/30/24 20:13


4 UNIT


 


 Ipratropium


 Bromide


  (AtrovENT UD)  0.5 mg  [id number]


 IH


   11/26/24 22:00


 12/26/24 21:59  12/1/24 02:24


0.5 MG


 


 Lactulose


  (Constulose 20gm/


 30ml Udcup)  20 gm  Q6H  PRN


 PO


 CONSTIPATION  11/26/24 23:30


 12/26/24 23:29   





 


 Levofloxacin/


 Dextrose  100 ml @ 


 100 mls/hr  Q24H


 IV


   11/27/24 21:00


 11/30/24 09:17 DC 11/29/24 19:43


100 MLS/HR


 


 Levofloxacin/


 Dextrose  150 ml @ 


 100 mls/hr  Q48H


 IV


   11/28/24 21:00


 11/27/24 13:56 DC  





 


 Linezolid  300 ml @ 


 150 mls/hr  Q12H


 IV


   11/27/24 15:00


 12/7/24 14:59  12/1/24 02:38


150 MLS/HR


 


 Magnesium Sulfate  50 ml @ 0


 mls/hr  PROTOCOL


 IV


   11/27/24 05:00


 12/27/24 04:59  11/29/24 06:22


25 MLS/HR


 


 Meropenem


  (Merrem)  1 gm  Q12H


 IVPB


   11/30/24 09:30


 12/10/24 09:29  11/30/24 20:15


1 GM


 


 Meropenem 1 gm/


 Sodium Chloride  100 ml @ 


 33.333 mls/


 hr  Q8H


 IVPB


   11/30/24 09:30


 11/30/24 09:20 DC  





 


 Methylprednisolone


 Sodium Succinate


  (Solu-medROL


 40MG)  60 mg  Q6H


 IVP


   11/27/24 15:00


 11/27/24 15:49 DC  





 


 Methylprednisolone


 Sodium Succinate


  (Solu-medROL


 125MG)  60 mg  Q6H


 IVP


   11/27/24 03:00


 11/27/24 13:27 DC 11/27/24 09:20


60 MG


 


 Metolazone


  (zarOXOlyn)  2.5 mg  BID


 PO


   11/27/24 21:00


 12/27/24 20:59  11/30/24 20:12


2.5 MG


 


 Metolazone


  (zarOXOlyn)  5 mg  Q2D


 PO


   11/27/24 09:00


 11/27/24 09:25 DC  





 


 Mirtazapine


  (REMeron 15 MG


 TAB)  30 mg  HS


 PO


   11/27/24 21:00


 12/27/24 20:59  11/30/24 20:12


30 MG


 


 Miscellaneous


 Medication


  (Simvastatin


  (Zocor))  40 mg  HS


 PO


   11/27/24 21:00


 11/27/24 08:54 DC  





 


 Montelukast Sodium


  (SinguLAIR)  10 mg  DAILY


 PO


   11/27/24 09:00


 12/27/24 08:59  11/30/24 09:01


10 MG


 


 Multivitamins


 Therapeutic


  (Multivitamin


 Tablet)  1 tab  DAILY


 PO


   11/28/24 09:00


 12/28/24 08:59  11/30/24 09:01


1 TAB


 


 Nystatin


  (NystOP 15 GM


 POWDER)  apply to


 abdominal


 fold  TID


 TP


   11/27/24 21:00


 12/27/24 20:59  11/30/24 20:14


1 APPL


 


 Ondansetron HCl


  (zoFRAN  4MG


 TABLET)  4 mg  Q6H  PRN


 PO


 nausea  11/27/24 13:30


 11/27/24 13:27 DC  





 


 Ondansetron HCl


  (zoFRAN 4MG INJ)  4 mg  Q6H  PRN


 IVP


 NAUSEA/VOMITING  11/26/24 23:30


 12/26/24 23:29   





 


 Prednisone


  (deltaSONE/


 oraSONE 20MG TAB)  40 mg  DAILY


 PO


   11/28/24 09:00


 12/1/24 08:59  11/30/24 09:01


40 MG


 


 Sodium Chloride  1,000 ml @ 


 75 mls/hr  [id number]


 IV


   11/28/24 10:30


 11/29/24 10:29 DC 11/28/24 10:28


75 MLS/HR


 


 Spironolactone


  (Aldactone 25mg)  25 mg  DAILY


 PO


   11/27/24 09:00


 11/27/24 09:25 DC  





 


 Tamsulosin HCl


  (FloMAX)  0.4 mg  HS


 PO


   11/27/24 21:00


 12/27/24 20:59  11/30/24 20:11


0.4 MG


 


 Temazepam


  (restORIL 15 MG


 CAP)  15 mg  HS  PRN


 PO


 INSOMNIA/SLEEP  11/26/24 23:30


 12/26/24 23:29  11/28/24 21:11


15 MG


 


 Tramadol HCl


  (UltRAM)  50 mg  Q6H  PRN


 PO


 PAIN LEVEL 7 TO 10  11/27/24 01:30


 12/2/24 01:29  11/28/24 21:11


50 MG


 


 Venlafaxine HCl


  (EffEXOR 50 mg


 TAB)  150 mg  DAILY


 PO


   11/27/24 09:00


 12/27/24 08:59  11/30/24 09:01


150 MG


 


 Wound Care/


 Dressing Products


  (Venelex


 Ointment)  1 APPL  TID


 TP


   11/29/24 14:00


 12/27/24 20:59  11/30/24 20:15


1 GM


 


 Wound Care/


 Dressing Products


  (Venelex


 Ointment)  1 gm  TID


 TP


   11/27/24 21:00


 11/29/24 12:57 DC 11/29/24 09:35


1 GM











DIAGNOSTICS / RADIOLOGY:


[ ]





ASSESSMENT:


Acute hypoxemic hypercapnic respiratory failure, requiring BiPAP 


COPD exacerbation


Acute complicated cystitis, POA


Chronic bilateral lower extremity venous ulcers


Acute on chronic lower extremity cellulitis


Anemia of chronic disease 


Hypomagnesemia


Hyperglycemia


Obstructive sleep apnea


Morbid obesity, BMI 41.1


Chronic problem list:  HLP, BPH, anemia, chronic back and knee pain, PTSD, 

morbid obesity


Deconditioned


Functional decline





PLAN:


-continue admission in the medical-surgical unit


-continue telemetry monitoring


-Monitor respiratory status closely, continue with 3 L nasal cannula


-continue BiPAP, 


-titrate O2.


-monitor ABGs and chest x-ray


-continue Albuterol and Atrovent nebulizer treatments scheduled q.4 hours.  


-RT to provide IS hourly as tolerated and education on use.


-Robitussin DM as needed cough.


-continue montelukast 10 mg p.o. daily.


-continue Mucomyst 400 mg IH q.4 hours.


-corticosteroids have been discontinued


-following pulmonologist's recommendations


-continue IV Merrem and linezolid


-Dr. Marquez for antimicrobial stewardship.


-continue Glucometer checks AC & HS needed with insulin regular sliding scale 

coverage as needed.


-Blood pressure checks every 4 hours and as needed. 


-continue to work with physical therapy


-monitor a.m. labs


-PRN Treatment - Add when necessary meds for nausea, vomiting, pain, 

constipation, insomnia.


-DVT/GI prophylaxis- Continue Lovenox at current doses.


-Full CODE STATUS


-Case management coordinating for SNF placement





This document was generated in part using voice recognition software, occasional

wrong word or sound alike substitutions may have occurred due to the inherent 

limitations of voice recognition software.  Read the chart carefully and 

recognize using context, where the substitutions have occurred.  Although every 

effort was made to edit the content, transcription and typing errors may occur


ATTESTATION BY PHYSICIAN


I have seen and examined the patient. I reviewed the documentation, medical 

decision making, and treatment plan as noted by the mid-level provider above. I 

agree with the findings and plan of care.











Yasemin Cano MD, [facility]            Dec 1, 2024 08:08


YASEMIN CANO MD              Dec 1, 2024 22:57

## 2024-12-01 NOTE — PN
[facility] PROGRESS NOTE 





Date Patient Seen: Dec 1, 2024 


Time of Visit: 15:32


Supervising Physician: Dr. Adrien Melo





Primary Care Physician: [[facility]]


Outpatient Specialists: [ ]


Inpatient Consults: [[facility]]





PROBLEM LIST:


Acute hypoxemic hypercapnic respiratory failure, requiring BiPAP 


MDRO multifocal pneumonia- POA- positive for MRSA on nasal swab, sputum cultures

with Klebsiella, proteus, and ESBL


Acute on chronic diastolic heart failure, POA- LVEF 55-60%


Acute renal failure from ATN 


COPD exacerbation, POA


Acute complicated cystitis, POA


Chronic bilateral lower extremity venous ulcers


Acute on chronic lower extremity cellulitis


Anemia of chronic disease 


Hypomagnesemia


Hyperglycemia


Hyperlipidemia


Benign prostate hyperplasia


Post traumatic stress disorder


Chronic back pain


Obstructive sleep apnea


Morbid obesity, BMI 41.1


Hx of DVT, R-popliteal vein 5/8/2024





Plan:


Prednisone


Order venous doppler negative


Restrict fluid intake, daily weights, monitor I&O


Echo with diastolic dysfunction


Adjust antibiotics for MDRO. ID is following.


Order sputum culture, follow blood culture result


Other management per primary





INTERVAL HISTORY:


Patient is awake alert and oriented x3 no acute event overnight.  Patient had 

been hypercapnic but can not tolerated BiPAP.  We will repeat blood gas.  Lab 

this morning with creatinine is up to 1.6 from 1.2.  Potassium is 4.0 bicarb is 

30.  BUN is 26 patient likely is hypovolemic.  We will give him IV fluids for 

today and repeat lab in the morning.  Check MRSA swab.  Continue antibiotic.  

Continue with prednisone.





11/29 patient is awake alert and oriented x3 no acute event overnight.  Vital 

signs blood pressure 124/94 heart rate is 76 respiratory rate is 20.  His T-max 

is 97.9 he remains on 5 L nasal cannula with saturation oxygen of 94%.  Remark

able lab findings with creatinine up to 1.7 from 1.6, patient had voided 5.7 L 

with balance-3.5 L. we will hold Lasix given worsening renal function and 

contraction alkalosis with bicarbonate  39.  We will complete NS today.  

Otherwise history repeat blood gas is steady pH 7.36, pCO2 57 bicarb is 32 PO2 

is 59.  Patient has been refusing BiPAP.  continue antibiotic and nebulizer. He 

may be benefited from a trilogy. 





11/30 patient is awake alert oriented X 3. no acute event overnight.  Vital 

signs blood pressure 136/70 heart rate is 76 T-max 98.1.  He is on 1 L of oxygen

support 93%.  Obtain chest x-ray this morning.  We had discontinued the IV fluid

yesterday and he put out 3.7 L of urine output with balance-2.2 L. culture of 

the sputum is growing Klebsiella, Proteus mirabilis, and positive MRSA.  

Continue with Zyvox for discontinue Levaquin.  Start patient on meropenem.  We 

will have case management to arrange for snf for antibiotic.





12/1 patient is awake alert oriented x3, lying down in his bed watching a movie 

on his laptop, not in acute distress.  He is feeling much better.  He remains on

oxygen at 3 L nasal cannula.  His creatinine is down to 1.3 from 1.4.  Continue 

with current regimen for MDRO pneumonia with meropenem and zyvox for MRSA. From 

pulmonary standpoint, he can go to SNF. Follow up with pulmonology in 2 weeks 

post SNF.





REVIEW OF SYSTEMS: 


12 point ROS reviewed with patient. Pertinent positives mentioned above. 

Otherwise negative.





PHYSICAL EXAM: 


GENERAL: alert, weak, awake oriented x 3


HEENT: EOMI, Sclera non icteric, moist mucosa 


NECK: Supple, no JVD, trachea midline 


LUNGS: Clear breath sounds bilaterally. No wheezes


HEART: Regular rate and rhythm. Normal S1 and S2, without murmurs 


ABD: Abdomen soft, nontender. Bowel sounds present


EXT: No clubbing cyanosis or edema


NEURO: Alert and oriented to person, follows commands





                             Vital Signs (last 8hr)








  Date Time  Temp Pulse Resp B/P (MAP) Pulse Ox O2 Delivery O2 Flow Rate FiO2


 


12/1/24 12:53 97.9 81 19 128/66 93 Nasal Cannula 2.0 


 


12/1/24 10:15  89 18   N/Cannula Low lpm 3.0 32


 


12/1/24 10:15  89 18     


 


12/1/24 08:00 98.2 78 18 110/53 91 Room Air  











LABS:





                                Hematology Labs:








Test


 12/1/24


05:19 Range/Units


 


 


White Blood Count 8.1  4.8-10.8  K/uL


 


Red Blood Count


 4.19 L


 4.50-6.20


MIL/uL


 


Hemoglobin 10.8 L 14.0-18.0  g/dL


 


Hematocrit 36.4 L 42-54  %


 


Mean Corpuscular Volume 86.9  79-99  fL


 


Mean Corpuscular Hemoglobin 25.8 L 27.0-33.0  pg


 


Mean Corpuscular Hemoglobin


Concent 29.7 L


 32.0-36.0  g/dL





 


Red Cell Distribution Width 16.6 H 11.0-15.5  %


 


Platelet Count 304  130-400  K/uL


 


Mean Platelet Volume 8.9  7.5-10.5  fL


 


Nucleated Red Blood Cells 0.0  0.0-0.19  %








                                 Chemistry Labs:








Test


 12/1/24


11:37 12/1/24


05:19 11/30/24


04:44 Range/Units


 


 


Whole Blood Glucose 100      MG/DL


 


Sodium Level  141   136-145  mmol/L


 


Potassium Level  3.7   3.5-5.1  mmol/L


 


Chloride Level  100 L  101-111  mmol/L


 


Carbon Dioxide Level  37 H  21-32  mmol/L


 


Blood Urea Nitrogen  32 H  7-18  mg/dL


 


Creatinine  1.3   0.5-1.3  mg/dL


 


Glomerular Filtration Rate


Calc 


 56 


 


 >90  mL/min





 


Random Glucose  117 H    mg/dL


 


Total Calcium  8.3 L  8.5-10.1  mg/dL


 


Magnesium Level


 


 


 2.10 


 1.80-2.40


mg/dL











DIAGNOSTICS / RADIOLOGY RESULTS:


[ ]





PLAN 





NEURO: 


Minimize central acting medications as possible. 


Maintain fall precautions, adequate lighting during the day





PULMONARY: 


Supplemental 02 as needed. 


Maintain aspiration precautions at all times


Home O2


NIPPV at night








CARDIOVASCULAR: 


Follow hemodynamics. 


Vital signs per facility protocol





GI & NUTRITION:


Continue with nutritional support.


Continue stool softeners and laxatives as needed.





KIDNEYS & ELECTROLYTES: 


Strict monitoring of intake, output and overall fluid balance. 


Avoid nephrotoxic medications to the extent possible. 


Medications to be dosed according to renal function.


Monitor electrolytes and replace as needed





ENDOCRINE:


Maintain blood glucose between 100-180 at all times.


Hypoglycemia protocol in place





INFECTIOUS DISEASE: 


Trend temperature, WBC and procalcitonin level


Follow cultures, deescalate antibiotics as soon as possible.


Panculture if new onset fever





ONCOLOGY/HEMATOLOGY/COAGULATION: 


Monitor for s/s of bleeding


Monitor hemoglobin, coagulation studies as needed





SKIN:


Pressure ulcer prevention per facility protocol


Specialty mattress





ORTHO/REHAB:


Continue PT/OT 





Prophylaxis:


Continue GI and DVT prophylaxis





Code Status: 


Full Resuscitation





Disposition:


TBD





Other:


Total patient care time exceeds 35 minutes excluding all procedures.











ROGER DEVINE CNP         Dec 1, 2024 15:33

## 2024-12-01 NOTE — PN
INFECTIOUS DISEASE  PROGRESS NOTE








Date of Service: Dec 1, 2024





SUBJECTIVE:


This is an 79-year-old male patient with past medical history of COPD with 

oxygen dependent and chronic bilateral lower extremities venous ulcers who was 

admitted to the hospital with chief complaint of shortness of breaths.


Patient was seen and examined at bedside in room 302.  Patient is awake, alert 

and oriented x3.  Continues on Oxygen support via nasal cannula.  Patient is 

afebrile, temperature is 98.2 and a WBC of 8.1.  Continue on meropenem and 

linezolid.  Patient continues with bilateral lower extremities erythema and 

edema. We will continue to monitor patient's care.








PHYSICAL EXAM


EYES:  Anicteric.  Pupils equal and reactive.


HENT: No oral thrush seen, moist Oral mucosa.


NECK:  Supple, no JVD or thyromegaly.


LUNGS:  Good air entry.  no rhonchi.  Crackles to bilateral upper lobes. Oxygen 

support.


CARDIOVASCULAR:  S1, S2 regular.  No murmur heard.


ABDOMEN:  Soft, non tender, bowel sounds present, no organomegaly.


CENTRAL NERVOUS SYSTEM:  Awake, alert, oriented x 3.  


SKIN:  No rashes, no swelling.  Bilateral lower extremity erythema.


LYMPHATICS: No peripheral lymphadenopathy.


MUSCULOSKELETAL:  No joint swelling, erythema or tenderness.


EXTREMITIES:  No cyanosis or clubbing.  Bilateral lower extremity edema.


BACK:  No deformity, no pressure ulcer.


GENITOURINARY:  No dysuria or hematuria.








Vital Sign (Last 12 Hours)








 12/1/24 12/1/24 12/1/24 12/1/24





 00:00 02:27 04:00 08:00


 


Temp 97.3  98.6 98.2


 


Pulse 113 84 88 78


 


Resp 20 18 20 18


 


B/P (MAP) 139/60  120/58 110/53


 


Pulse Ox 92  92 91


 


O2 Delivery Nasal Cannula  Nasal Cannula Room Air


 


O2 Flow Rate 3.0  3.0 


 


    





 12/1/24 12/1/24  





 10:15 10:15  


 


Pulse 89 89  


 


Resp 18 18  


 


O2 Delivery  N/Cannula Low lpm  


 


O2 Flow Rate  3.0  


 


FiO2  32  














Intake & Output (last 24hrs) 0  


 


 11/30/24 11/30/24 12/1/24





 15:00 23:00 07:00


 


Intake Total  2740.0 ml 660.0 ml


 


Output Total 1060 ml 2650 ml 1950 ml


 


Balance -1060 ml 90.0 ml -1290.0 ml











LABS:








                                   Laboratory:








Test


 12/1/24


11:37 12/1/24


05:19 11/30/24


04:44 Range/Units


 


 


Whole Blood Glucose 100      MG/DL


 


White Blood Count  8.1   4.8-10.8  K/uL


 


Red Blood Count


 


 4.19 L


 


 4.50-6.20


MIL/uL


 


Hemoglobin  10.8 L  14.0-18.0  g/dL


 


Hematocrit  36.4 L  42-54  %


 


Mean Corpuscular Volume  86.9   79-99  fL


 


Mean Corpuscular Hemoglobin  25.8 L  27.0-33.0  pg


 


Mean Corpuscular Hemoglobin


Concent 


 29.7 L


 


 32.0-36.0  g/dL





 


Red Cell Distribution Width  16.6 H  11.0-15.5  %


 


Platelet Count  304   130-400  K/uL


 


Mean Platelet Volume  8.9   7.5-10.5  fL


 


Nucleated Red Blood Cells  0.0   0.0-0.19  %


 


Sodium Level  141   136-145  mmol/L


 


Potassium Level  3.7   3.5-5.1  mmol/L


 


Chloride Level  100 L  101-111  mmol/L


 


Carbon Dioxide Level  37 H  21-32  mmol/L


 


Blood Urea Nitrogen  32 H  7-18  mg/dL


 


Creatinine  1.3   0.5-1.3  mg/dL


 


Glomerular Filtration Rate


Calc 


 56 


 


 >90  mL/min





 


Random Glucose  117 H    mg/dL


 


Total Calcium  8.3 L  8.5-10.1  mg/dL


 


Magnesium Level


 


 


 2.10 


 1.80-2.40


mg/dL














ASSESSMENT:


Hypoxic respiratory failure.


Gram-negative Pneumonia.


Bilateral lower extremity cellulitis.


Bilateral lower extremities chronic leg ulcers.


Medical noncompliance.  


COPD with home O2 dependent.


Morbid obesity.





PLAN:


Continue on meropenem.


Continue linezolid.  


Keep lower extremities Elevated.


Continue bronchodilators.


Continue oxygen support.  


Continue diuretics as currently ordered.


Pending case management evaluation for SNF referral.





This case was reviewed and discussed with my supervising physician and the above

assessment and plan was formulated and agreed upon.


ATTESTATION BY PHYSICIAN


I have seen and examined the patient. I reviewed the documentation, medical 

decision making, and treatment plan as noted by the mid-level provider above. I 

agree with the findings and plan of care.











MELVIN GARCIA MD, MIRTA L [facility]              Dec 1, 2024 11:47

## 2024-12-01 NOTE — NUR
DC PLAN



RECEIVED TRIGGER FOR SNF. SPOKE TO PATIENT. [facility]. [facility] 
[facility]. EXPLAINED THAT DUE TO WEEKEND AND HOLIDAY [facility] WILL RECE[facility]FERRAL 
ON MONDAY. PACKET MADE AND SENT TO FACILITY. REP NOTIFIED WILL VISIT WITH PATIENT TO DO 
EVAL. 

-------------------------------------------------------------------------------

Addendum: 12/01/24 at 1630 by BRENDON PHILIPPE RN CM

-------------------------------------------------------------------------------

Amended: Links added.

## 2024-12-02 VITALS — HEART RATE: 80 BPM | RESPIRATION RATE: 21 BRPM

## 2024-12-02 VITALS — HEART RATE: 83 BPM | OXYGEN SATURATION: 93 % | RESPIRATION RATE: 21 BRPM

## 2024-12-02 VITALS
DIASTOLIC BLOOD PRESSURE: 68 MMHG | HEART RATE: 83 BPM | RESPIRATION RATE: 19 BRPM | TEMPERATURE: 97.6 F | SYSTOLIC BLOOD PRESSURE: 144 MMHG

## 2024-12-02 VITALS
HEART RATE: 91 BPM | RESPIRATION RATE: 19 BRPM | SYSTOLIC BLOOD PRESSURE: 135 MMHG | TEMPERATURE: 98.1 F | DIASTOLIC BLOOD PRESSURE: 70 MMHG

## 2024-12-02 VITALS
RESPIRATION RATE: 16 BRPM | HEART RATE: 90 BPM | DIASTOLIC BLOOD PRESSURE: 59 MMHG | TEMPERATURE: 97.7 F | SYSTOLIC BLOOD PRESSURE: 116 MMHG

## 2024-12-02 VITALS
RESPIRATION RATE: 18 BRPM | HEART RATE: 91 BPM | DIASTOLIC BLOOD PRESSURE: 73 MMHG | TEMPERATURE: 98.1 F | SYSTOLIC BLOOD PRESSURE: 136 MMHG

## 2024-12-02 VITALS
HEART RATE: 115 BPM | OXYGEN SATURATION: 95 % | SYSTOLIC BLOOD PRESSURE: 118 MMHG | DIASTOLIC BLOOD PRESSURE: 68 MMHG | TEMPERATURE: 97.6 F | RESPIRATION RATE: 20 BRPM

## 2024-12-02 VITALS
HEART RATE: 80 BPM | SYSTOLIC BLOOD PRESSURE: 147 MMHG | DIASTOLIC BLOOD PRESSURE: 61 MMHG | TEMPERATURE: 97.8 F | RESPIRATION RATE: 20 BRPM

## 2024-12-02 VITALS — RESPIRATION RATE: 21 BRPM | HEART RATE: 80 BPM

## 2024-12-02 VITALS — HEART RATE: 86 BPM | RESPIRATION RATE: 21 BRPM

## 2024-12-02 VITALS — HEART RATE: 83 BPM | RESPIRATION RATE: 18 BRPM

## 2024-12-02 VITALS — RESPIRATION RATE: 21 BRPM | HEART RATE: 83 BPM | OXYGEN SATURATION: 93 %

## 2024-12-02 VITALS — RESPIRATION RATE: 21 BRPM | HEART RATE: 83 BPM

## 2024-12-02 VITALS — OXYGEN SATURATION: 92 %

## 2024-12-02 LAB
BASOPHILS # BLD AUTO: 0.04 K/UL (ref 0–0.2)
BASOPHILS NFR BLD AUTO: 0.6 % (ref 0–5)
BUN SERPL-MCNC: 34 MG/DL (ref 7–18)
CHLORIDE SERPL-SCNC: 99 MMOL/L (ref 101–111)
CO2 SERPL-SCNC: 34 MMOL/L (ref 21–32)
CREAT SERPL-MCNC: 1.4 MG/DL (ref 0.5–1.3)
EOSINOPHIL # BLD AUTO: 0.24 K/UL (ref 0–0.7)
EOSINOPHIL NFR BLD AUTO: 3.4 % (ref 0–8)
ERYTHROCYTE [DISTWIDTH] IN BLOOD BY AUTOMATED COUNT: 16.7 % (ref 11–15.5)
GFR SERPL CREATININE-BSD FRML MDRD: 51 ML/MIN (ref 90–?)
GLUCOSE SERPL-MCNC: 161 MG/DL (ref 70–105)
HCT VFR BLD AUTO: 37.8 % (ref 42–54)
IMM GRANULOCYTES # BLD: 0.1 K/UL (ref 0–1)
LYMPHOCYTES # SPEC AUTO: 1.4 K/UL (ref 1–4.8)
LYMPHOCYTES NFR SPEC AUTO: 19.4 % (ref 21–51)
MCH RBC QN AUTO: 26.4 PG (ref 27–33)
MCHC RBC AUTO-ENTMCNC: 30.4 G/DL (ref 32–36)
MCV RBC AUTO: 86.9 FL (ref 79–99)
MONOCYTES # BLD AUTO: 0.5 K/UL (ref 0.1–1)
MONOCYTES NFR BLD AUTO: 6.8 % (ref 3–13)
NEUTROPHILS # BLD AUTO: 4.9 K/UL (ref 1.8–7.7)
NEUTROPHILS NFR BLD AUTO: 68.4 % (ref 40–77)
NRBC BLD MANUAL-RTO: 0 % (ref 0–0.19)
PLATELET # BLD AUTO: 295 K/UL (ref 130–400)
POTASSIUM SERPL-SCNC: 3.6 MMOL/L (ref 3.5–5.1)
RBC # BLD AUTO: 4.35 MIL/UL (ref 4.5–6.2)
SODIUM SERPL-SCNC: 139 MMOL/L (ref 136–145)
WBC # BLD AUTO: 7.1 K/UL (ref 4.8–10.8)

## 2024-12-02 NOTE — NUR
NURSING ROUNDS

PATIENT CONTINUES AWAKE AT THIS TIME, IS WATCHING SHOW ON LAPTOP, DENIES PAIN AT THIS TIME. 
ALL BELONGINGS WITHIN REACH, BED ALARM ARMED.

## 2024-12-02 NOTE — PN
[facility] PROGRESS NOTE 





Date Patient Seen: Dec 2, 2024 


Time of Visit: 20:35


Supervising Physician: Adrien Kirby





Primary Care Physician: [[facility]]


Outpatient Specialists: [ ]


Inpatient Consults: [[facility]]





PROBLEM LIST:


Acute hypoxemic hypercapnic respiratory failure, requiring BiPAP 


MDRO multifocal pneumonia- POA- positive for MRSA on nasal swab, sputum cultures

with Klebsiella, proteus, and ESBL


Acute on chronic diastolic heart failure, POA- LVEF 55-60%


Acute renal failure from ATN 


COPD exacerbation, POA


Acute complicated cystitis, POA


Chronic bilateral lower extremity venous ulcers


Acute on chronic lower extremity cellulitis


Anemia of chronic disease 


Hypomagnesemia


Hyperglycemia


Hyperlipidemia


Benign prostate hyperplasia


Post traumatic stress disorder


Chronic back pain


Obstructive sleep apnea


Morbid obesity, BMI 41.1


Hx of DVT, R-popliteal vein 5/8/2024





Plan:


Prednisone


Order venous doppler negative


Restrict fluid intake, daily weights, monitor I&O


Echo with diastolic dysfunction


Adjust antibiotics for MDRO. ID is following.


Order sputum culture, follow blood culture result


Other management per primary





INTERVAL HISTORY:


Patient is awake alert and oriented x3 no acute event overnight.  Patient had 

been hypercapnic but can not tolerated BiPAP.  We will repeat blood gas.  Lab 

this morning with creatinine is up to 1.6 from 1.2.  Potassium is 4.0 bicarb is 

30.  BUN is 26 patient likely is hypovolemic.  We will give him IV fluids for 

today and repeat lab in the morning.  Check MRSA swab.  Continue antibiotic.  

Continue with prednisone.





11/29 patient is awake alert and oriented x3 no acute event overnight.  Vital 

signs blood pressure 124/94 heart rate is 76 respiratory rate is 20.  His T-max 

is 97.9 he remains on 5 L nasal cannula with saturation oxygen of 94%.  Remar

kable lab findings with creatinine up to 1.7 from 1.6, patient had voided 5.7 L 

with balance-3.5 L. we will hold Lasix given worsening renal function and 

contraction alkalosis with bicarbonate  39.  We will complete NS today.  

Otherwise history repeat blood gas is steady pH 7.36, pCO2 57 bicarb is 32 PO2 

is 59.  Patient has been refusing BiPAP.  continue antibiotic and nebulizer. He 

may be benefited from a trilogy. 





11/30 patient is awake alert oriented X 3. no acute event overnight.  Vital 

signs blood pressure 136/70 heart rate is 76 T-max 98.1.  He is on 1 L of oxygen

support 93%.  Obtain chest x-ray this morning.  We had discontinued the IV fluid

yesterday and he put out 3.7 L of urine output with balance-2.2 L. culture of 

the sputum is growing Klebsiella, Proteus mirabilis, and positive MRSA.  

Continue with Zyvox for discontinue Levaquin.  Start patient on meropenem.  We 

will have case management to arrange for snf for antibiotic.





12/1 patient is awake alert oriented x3, lying down in his bed watching a movie 

on his laptop, not in acute distress.  He is feeling much better.  He remains on

oxygen at 3 L nasal cannula.  His creatinine is down to 1.3 from 1.4.  Continue 

with current regimen for MDRO pneumonia with meropenem and zyvox for MRSA. From 

pulmonary standpoint, he can go to SNF. Follow up with pulmonology in 2 weeks 

post SNF. 





12/2 patient is awake alert and oriented x3 not in acute distress.  He remains 

on baseline oxygen 3 L nasal cannula sats 95%.  Otherwise continue antibiotic 

for MDRO pneumonia.  Continue nebulizer as needed.





From pulmonary/critical care standpoint patient is stable to transfer to snf.  

To follow up with pulmonary services in two weeks.





REVIEW OF SYSTEMS: 


12 point ROS reviewed with patient. Pertinent positives mentioned above. 

Otherwise negative.





PHYSICAL EXAM: 


GENERAL: alert, weak, awake oriented x 3


HEENT: EOMI, Sclera non icteric, moist mucosa 


NECK: Supple, no JVD, trachea midline 


LUNGS: Clear breath sounds bilaterally. No wheezes


HEART: Regular rate and rhythm. Normal S1 and S2, without murmurs 


ABD: Abdomen soft, nontender. Bowel sounds present


EXT: No clubbing cyanosis or edema


NEURO: Alert and oriented to person, follows commands





                             Vital Signs (last 8hr)








  Date Time  Temp Pulse Resp B/P (MAP) Pulse Ox O2 Delivery O2 Flow Rate FiO2


 


12/2/24 20:00 97.5 115 20 118/68 95 Room Air  


 


12/2/24 18:56  83 21   N/Cannula Low lpm 2.0 


 


12/2/24 18:54  80 21     


 


12/2/24 16:00 98.1 91 19 135/70 92 Nasal Cannula 3.0 


 


12/2/24 14:51  80 21     











LABS:





                                Hematology Labs:








Test


 12/2/24


05:29 Range/Units


 


 


White Blood Count 7.1  4.8-10.8  K/uL


 


Red Blood Count


 4.35 L


 4.50-6.20


MIL/uL


 


Hemoglobin 11.5 L 14.0-18.0  g/dL


 


Hematocrit 37.8 L 42-54  %


 


Mean Corpuscular Volume 86.9  79-99  fL


 


Mean Corpuscular Hemoglobin 26.4 L 27.0-33.0  pg


 


Mean Corpuscular Hemoglobin


Concent 30.4 L


 32.0-36.0  g/dL





 


Red Cell Distribution Width 16.7 H 11.0-15.5  %


 


Platelet Count 295  130-400  K/uL


 


Mean Platelet Volume 9.1  7.5-10.5  fL


 


Immature Granulocyte % (Auto) 1.4 H 0-1  %


 


Neutrophils (%) (Auto) 68.4  40.0-77.0  %


 


Lymphocytes (%) (Auto) 19.4 L 21.0-51.0  %


 


Monocytes (%) (Auto) 6.8  3.0-13.0  %


 


Eosinophils (%) (Auto) 3.4  0.0-8.0  %


 


Basophils (%) (Auto) 0.6  0.0-5.0  %


 


Neutrophils # (Auto) 4.9  1.8-7.7  K/uL


 


Lymphocytes # (Auto) 1.4  1.0-4.8  K/uL


 


Monocytes # (Auto) 0.5  0.1-1.0  K/uL


 


Eosinophils # (Auto) 0.24  0.00-0.70  K/uL


 


Basophils # (Auto) 0.04  0.00-0.20  K/uL


 


Absolute Immature Granulocyte


(auto 0.10 


 0-1  K/uL





 


Nucleated Red Blood Cells 0.0  0.0-0.19  %








                                 Chemistry Labs:








Test


 12/2/24


19:41 12/2/24


05:29 Range/Units


 


 


Whole Blood Glucose 134 H    MG/DL


 


Sodium Level  139  136-145  mmol/L


 


Potassium Level  3.6  3.5-5.1  mmol/L


 


Chloride Level  99 L 101-111  mmol/L


 


Carbon Dioxide Level  34 H 21-32  mmol/L


 


Blood Urea Nitrogen  34 H 7-18  mg/dL


 


Creatinine  1.4 H 0.5-1.3  mg/dL


 


Glomerular Filtration Rate


Calc 


 51 


 >90  mL/min





 


Random Glucose  161 H   mg/dL


 


Total Calcium  8.0 L 8.5-10.1  mg/dL











DIAGNOSTICS / RADIOLOGY RESULTS:


[ ]





PLAN 





NEURO: 


Minimize central acting medications as possible. 


Maintain fall precautions, adequate lighting during the day





PULMONARY: 


Supplemental 02 as needed. 


Maintain aspiration precautions at all times


Home O2


NIPPV at night








CARDIOVASCULAR: 


Follow hemodynamics. 


Vital signs per facility protocol





GI & NUTRITION:


Continue with nutritional support.


Continue stool softeners and laxatives as needed.





KIDNEYS & ELECTROLYTES: 


Strict monitoring of intake, output and overall fluid balance. 


Avoid nephrotoxic medications to the extent possible. 


Medications to be dosed according to renal function.


Monitor electrolytes and replace as needed





ENDOCRINE:


Maintain blood glucose between 100-180 at all times.


Hypoglycemia protocol in place





INFECTIOUS DISEASE: 


Trend temperature, WBC and procalcitonin level


Follow cultures, deescalate antibiotics as soon as possible.


Panculture if new onset fever





ONCOLOGY/HEMATOLOGY/COAGULATION: 


Monitor for s/s of bleeding


Monitor hemoglobin, coagulation studies as needed





SKIN:


Pressure ulcer prevention per facility protocol


Specialty mattress





ORTHO/REHAB:


Continue PT/OT 





Prophylaxis:


Continue GI and DVT prophylaxis





Code Status: 


Full Resuscitation





Disposition:


TBD





Other:


Total patient care time exceeds 35 minutes excluding all procedures.











ROGER DEVINE [facility]         Dec 2, 2024 20:35

## 2024-12-02 NOTE — PN
CATAL[facility] PROGRESS NOTE








Date of Service: Dec 2, 2024 


Time of Service: 09:56





SUBJECTIVE:


[  


Patient seen and examined in room 302


PCP Dr. Jimenez Kendall


Admitting date 11/26/2024





Mr. Lozoya is an 79-year-old  male with a history of hypertension, CAD,

 obstructive sleep apnea, COPD, O2 dependent usually on 3 L O2, chronic knee and

 back pain, AAA, and chronic bilateral lower extremity venous ulcers, chronic 

lower extremity cellulitis who [place] [facility] [place] for evaluation of 

shortness of breath.  According to EMS [facility].  The patient reported cough, congestion, and some mild 

difficulty breathing. 


Chest x-ray:  Mild bilateral pulmonary infiltrates are seen may be related to 

mild pulmonary vascular congestion with possible superimposed pneumonitis.  ABGs

:  PH 7.308, pCO2 60, PO2 72.5, bicarbonate 29.5, O2 sats 93.


ED provider request patient be admitted to the hospital with a diagnosis of 

pneumonia, status dermatitis to bilateral lower extremities, and COPD 

exacerbation.





11/27 patient was seen by nurse practitioner and physician during rounding in 

room 302 lying in bed.  Patient just underwent 2D echo and at this moment he is 

on 6 L nasal cannula.  RT was called to place patient on a BiPAP due to most 

recent ABG gases abnormal results.  We are pending further evaluation/bety

mmendations of pulmonologist, ID and wound.  Patient continues to be on 

levofloxacin methylprednisolone IV ipratropium and albuterol.  We will continue 

to monitor patient in the meantime.  A.m. labs.





11/28 patient was seen by nurse practitioner and physician during rounding in 

room 302.  Patient is pending most recent ABG gases.  Patient is refusing BiPAP 

and at this moment he is on 5 L nasal cannula.  Per RN patient was on BiPAP  

yesterday all day but unfortunately in the evening he already declined BiPAP.  

2D echo showed 55 to 60% EF.  Venous Doppler and chest CT is pending.  Per ID 

patient is on Zyvox and levofloxacin.  Patient also is on Lasix 40 mg Q 8 hours.

  Methylprednisolone will discontinue patient was placed on prednisolone POA.  

Pulmonology and ID is following the patient.  Patient stated he feels much 

better compared to the previous day.  We will continue to monitor patient in the

 meantime.  A.m. labs.  PT working with the patient]





11/29 patient has been evaluated in room 302, he continues with3 L oxygen 

supplementation with nasal cannula, saturating at 95%.  I reviewed imaging CT 

chest showed tiny bilateral pleural effusion mild pulmonary infiltrates 

bilaterally.  Apparently patient is refusing BiPAP.  Pulmonologist indicated 

that he will probably benefit to trilogy.  At this point, patient will continue 

with IV antibiotics respiratory culture came back Gram-negative rods.  Patient 

also has MRSA to the nares.  Patient continues with prednisone 40 mg p.o. daily 

and Levaquin IV.





11/30 patient was evaluated in the room.  He continues with oxygen 

supplementation at 3 L.  Patient is back at baseline.  Patient did mention that 

he still feels very weak unable to ambulate.  He would like to go to a skilled 

nursing facility to continue with rehabilitation.  We will be consulting case 

management and physical therapist to emerson.





12/1 Today on bedside evaluation patient was found awake alert and oriented x 3.

 Latest vitals are stable, satting 92% on2 L nasal cannula.  Today's labs are 

stable.  Respiratory cultures growing ESBL Klebsiella pneumoniae and Proteus 

mirabilis.  Id continues IV Merrem and linezolid.  Case management coordinating 

for placement.





12/2 patient was evaluated in 302, patient is lying in bed with head of the bed 

elevated, on O2 at 2 L via nasal cannula.  Patient continues with IV antibiotic 

with Merrem and Zyvox, patient is ESBL positive with Klebsiella pneumoniae and 

Proteus mirabilis on sputum.  Patient is currently asleep but able to wake up on

verbal and tactile stimuli.  He is excited to transferred to [facility] 

but we are awaiting for acceptance.  Med rec signed.





REVIEW OF SYSTEMS


12-point ROS reviewed with patient.  All pertinent positives mentioned above.  

Otherwise negative, noncontributory, or non-pertinent.





PHYSICAL EXAM


GENERAL APPEARANCE:  The patient is awake, alert, and oriented, in no acute 

cardiopulmonary distress.


NEUROLOGICAL:  Cranial nerves II-XII grossly intact.  Motor is 5/5 in bilateral 

upper and lower extremities proximal to distal.  No sensory deficits.


HEENT:  Face is symmetric. Pupils are equal and reactive.  Extraocular movements

are intact.


NECK:  Supple.  No JVD. No thyromegaly. No submental, submandibular, pre-

/postauricular, occipital or supraclavicular lymphadenopathy.


CHEST:  Normal chest expansion. No Telemetry.


LUNGS:  Tachypneic, wheezing, and lungs sounded wet.  Patient had a very 

productive cough, and was spitting phlegm into a napkin while the I assessed 

him.  


CARDIOVASCULAR:  Regular.  S1 and S2 normal.  No appreciable rubs, murmurs or 

gallops. 


ABDOMEN:  Soft, nontender, and nondistended.  There is no rebound, voluntary 

guarding, or rigidity.


:  Deferred. No Antunez.


EXTREMITIES:  Non-edematous and not cyanotic.  No clubbing.  Good capillary 

refill.


SKIN:  No skin breakdown.





                             Vital Signs (last 8hr)








  Date Time  Temp Pulse Resp B/P (MAP) Pulse Ox O2 Delivery O2 Flow Rate FiO2


 


12/2/24 08:00 97.9 80 20 147/61 92 Nasal Cannula 3.0 


 


12/2/24 07:07  83 21   N/Cannula Low lpm 2.0 


 


12/2/24 07:05  83 21     


 


12/2/24 04:04 98.1 91 18 136/73 92 Nasal Cannula 3.0 











LABS:








                                   Laboratory:








Test


 12/2/24


05:29 12/2/24


05:15 Range/Units


 


 


White Blood Count 7.1   4.8-10.8  K/uL


 


Red Blood Count


 4.35 L


 


 4.50-6.20


MIL/uL


 


Hemoglobin 11.5 L  14.0-18.0  g/dL


 


Hematocrit 37.8 L  42-54  %


 


Mean Corpuscular Volume 86.9   79-99  fL


 


Mean Corpuscular Hemoglobin 26.4 L  27.0-33.0  pg


 


Mean Corpuscular Hemoglobin


Concent 30.4 L


 


 32.0-36.0  g/dL





 


Red Cell Distribution Width 16.7 H  11.0-15.5  %


 


Platelet Count 295   130-400  K/uL


 


Mean Platelet Volume 9.1   7.5-10.5  fL


 


Immature Granulocyte % (Auto) 1.4 H  0-1  %


 


Neutrophils (%) (Auto) 68.4   40.0-77.0  %


 


Lymphocytes (%) (Auto) 19.4 L  21.0-51.0  %


 


Monocytes (%) (Auto) 6.8   3.0-13.0  %


 


Eosinophils (%) (Auto) 3.4   0.0-8.0  %


 


Basophils (%) (Auto) 0.6   0.0-5.0  %


 


Neutrophils # (Auto) 4.9   1.8-7.7  K/uL


 


Lymphocytes # (Auto) 1.4   1.0-4.8  K/uL


 


Monocytes # (Auto) 0.5   0.1-1.0  K/uL


 


Eosinophils # (Auto) 0.24   0.00-0.70  K/uL


 


Basophils # (Auto) 0.04   0.00-0.20  K/uL


 


Absolute Immature Granulocyte


(auto 0.10 


 


 0-1  K/uL





 


Nucleated Red Blood Cells 0.0   0.0-0.19  %


 


Sodium Level 139   136-145  mmol/L


 


Potassium Level 3.6   3.5-5.1  mmol/L


 


Chloride Level 99 L  101-111  mmol/L


 


Carbon Dioxide Level 34 H  21-32  mmol/L


 


Blood Urea Nitrogen 34 H  7-18  mg/dL


 


Creatinine 1.4 H  0.5-1.3  mg/dL


 


Glomerular Filtration Rate


Calc 51 


 


 >90  mL/min





 


Random Glucose 161 H    mg/dL


 


Total Calcium 8.0 L  8.5-10.1  mg/dL


 


Whole Blood Glucose  167 H   MG/DL











                               Current Medications








 Medications


  (Trade)  Dose


 Ordered  Sig/Christi


 Route


 PRN Reason  Start Time


 Stop Time Status Last Admin


Dose Admin


 


 Acetaminophen


  (TYLenol 325MG


 TAB)  650 mg  Q6H  PRN


 PO


 FEVER/MILD PAIN LEVEL 1-3  11/26/24 23:30


 12/26/24 23:29   





 


 Acetaminophen


  (TYLenol 650MG


 SUPPOSITORY)  650 mg  Q6H  PRN


 RC


 FEVER / MILD PAIN  1-3 IF NPO  11/26/24 23:30


 12/26/24 23:29   





 


 Acetylcysteine


  (MUComyst 20%


 4ML)  400 mg  [id number]


 IH


   11/26/24 22:10


 12/2/24 09:31 DC 12/2/24 07:01


400 MG


 


 Albuterol Sulfate


  (Proventil


 0.083% 2.5mg/3ml)  2.5 mg  [id number]


 IH


   11/26/24 22:00


 12/26/24 21:59  12/2/24 07:01


2.5 MG


 


 Atorvastatin


 Calcium


  (LIPItor 40MG)  40 mg  HS


 PO


   11/27/24 21:00


 12/27/24 20:59  12/1/24 20:01


40 MG


 


 Docusate Sodium


  (COLace 100MG


 CAP)  100 mg  BID  PRN


 PO


 c  11/26/24 23:30


 12/26/24 23:29  11/28/24 08:27


100 MG


 


 Enoxaparin Sodium


  (Lovenox)  40 mg  DAILY


 SQ


   11/27/24 09:00


 12/27/24 08:59  12/2/24 09:47


40 MG


 


 Furosemide


  (LASix 40MG VIAL)  40 mg  Q8H


 IV


   11/27/24 16:00


 11/29/24 09:05 DC 11/28/24 23:36


40 MG


 


 Guaifenesin


  (RobiTUSSin


 SUGAR-FREE 100 MG/


 5 ML UDCUP)  400 mg  Q4H  PRN


 PO


 COUGH/COLD SYMPTOMS  11/27/24 05:30


 12/27/24 05:29  11/28/24 21:12


400 MG


 


 Home Med


  (Home Medication)  Prazosin


 HCl  1 MG  HS


 PO


   11/27/24 21:00


 12/27/24 20:59   





 


 Home Med


  (Home Medication)  Ropinirole


 HCl  4 MG  QIDP  PRN


 PO


 LP  11/27/24 09:30


 12/27/24 09:29   





 


 Home Med


  (Home Medication)  Sertraline


 HCl  25 MG  DAILY


 PO


   11/28/24 09:00


 11/27/24 09:25 DC  





 


 Hydralazine HCl


  (APRESOLine 20MG


 INJ)  10 mg  Q2H  PRN


 IV


 SBP GREATER THAN 160  11/26/24 23:30


 12/26/24 23:29   





 


 Insulin Human


 Regular


  (humuLIN R 100


 UNIT/ML 3ML)  INSULIN


 SLIDING


 SCAL...  ACHS


 SQ


   11/27/24 07:30


 12/27/24 07:29  11/30/24 20:13


4 UNIT


 


 Ipratropium


 Bromide


  (AtrovENT UD)  0.5 mg  [id number]


 IH


   11/26/24 22:00


 12/26/24 21:59  12/2/24 07:01


0.5 MG


 


 Lactulose


  (Constulose 20gm/


 30ml Udcup)  20 gm  Q6H  PRN


 PO


 CONSTIPATION  11/26/24 23:30


 12/26/24 23:29   





 


 Levofloxacin/


 Dextrose  100 ml @ 


 100 mls/hr  Q24H


 IV


   11/27/24 21:00


 11/30/24 09:17 DC 11/29/24 19:43


100 MLS/HR


 


 Levofloxacin/


 Dextrose  150 ml @ 


 100 mls/hr  Q48H


 IV


   11/28/24 21:00


 11/27/24 13:56 DC  





 


 Linezolid  300 ml @ 


 150 mls/hr  Q12H


 IV


   11/27/24 15:00


 12/7/24 14:59  12/2/24 03:04


150 MLS/HR


 


 Magnesium Sulfate  50 ml @ 0


 mls/hr  PROTOCOL


 IV


   11/27/24 05:00


 12/27/24 04:59  11/29/24 06:22


25 MLS/HR


 


 Meropenem


  (Merrem)  1 gm  Q12H


 IVPB


   11/30/24 09:30


 12/10/24 09:29  12/2/24 09:47


1 GM


 


 Meropenem 1 gm/


 Sodium Chloride  100 ml @ 


 33.333 mls/


 hr  Q8H


 IVPB


   11/30/24 09:30


 11/30/24 09:20 DC  





 


 Methylprednisolone


 Sodium Succinate


  (Solu-medROL


 40MG)  60 mg  Q6H


 IVP


   11/27/24 15:00


 11/27/24 15:49 DC  





 


 Methylprednisolone


 Sodium Succinate


  (Solu-medROL


 125MG)  60 mg  Q6H


 IVP


   11/27/24 03:00


 11/27/24 13:27 DC 11/27/24 09:20


60 MG


 


 Metolazone


  (zarOXOlyn)  2.5 mg  BID


 PO


   11/27/24 21:00


 12/27/24 20:59  12/2/24 09:46


2.5 MG


 


 Metolazone


  (zarOXOlyn)  5 mg  Q2D


 PO


   11/27/24 09:00


 11/27/24 09:25 DC  





 


 Mirtazapine


  (REMeron 15 MG


 TAB)  30 mg  HS


 PO


   11/27/24 21:00


 12/27/24 20:59  12/1/24 20:01


30 MG


 


 Miscellaneous


 Medication


  (Simvastatin


  (Zocor))  40 mg  HS


 PO


   11/27/24 21:00


 11/27/24 08:54 DC  





 


 Montelukast Sodium


  (SinguLAIR)  10 mg  DAILY


 PO


   11/27/24 09:00


 12/27/24 08:59  12/2/24 09:46


10 MG


 


 Multivitamins


 Therapeutic


  (Multivitamin


 Tablet)  1 tab  DAILY


 PO


   11/28/24 09:00


 12/28/24 08:59  12/2/24 09:46


1 TAB


 


 Nystatin


  (NystOP 15 GM


 POWDER)  apply to


 abdominal


 fold  TID


 TP


   11/27/24 21:00


 12/27/24 20:59  12/1/24 20:08


1 APPL


 


 Ondansetron HCl


  (zoFRAN  4MG


 TABLET)  4 mg  Q6H  PRN


 PO


 nausea  11/27/24 13:30


 11/27/24 13:27 DC  





 


 Ondansetron HCl


  (zoFRAN 4MG INJ)  4 mg  Q6H  PRN


 IVP


 NAUSEA/VOMITING  11/26/24 23:30


 12/26/24 23:29   





 


 Prednisone


  (deltaSONE/


 oraSONE 20MG TAB)  40 mg  DAILY


 PO


   11/28/24 09:00


 12/1/24 08:59 DC 11/30/24 09:01


40 MG


 


 Ropinirole HCl


  (REquip)  1 mg  QID


 PO


   12/1/24 17:00


 12/31/24 16:59  12/2/24 09:46


1 MG


 


 Sodium Chloride  1,000 ml @ 


 75 mls/hr  [id number]


 IV


   11/28/24 10:30


 11/29/24 10:29 DC 11/28/24 10:28


75 MLS/HR


 


 Spironolactone


  (Aldactone 25mg)  25 mg  DAILY


 PO


   11/27/24 09:00


 11/27/24 09:25 DC  





 


 Tamsulosin HCl


  (FloMAX)  0.4 mg  HS


 PO


   11/27/24 21:00


 12/27/24 20:59  12/1/24 20:01


0.4 MG


 


 Temazepam


  (restORIL 15 MG


 CAP)  15 mg  HS  PRN


 PO


 INSOMNIA/SLEEP  11/26/24 23:30


 12/26/24 23:29  11/28/24 21:11


15 MG


 


 Tramadol HCl


  (UltRAM)  50 mg  Q6H  PRN


 PO


 PAIN LEVEL 7 TO 10  11/27/24 01:30


 12/2/24 09:10 DC 11/28/24 21:11


50 MG


 


 Venlafaxine HCl


  (EffEXOR 50 mg


 TAB)  150 mg  DAILY


 PO


   11/27/24 09:00


 12/27/24 08:59  12/2/24 09:46


150 MG


 


 Wound Care/


 Dressing Products


  (Venelex


 Ointment)  1 APPL  TID


 TP


   11/29/24 14:00


 12/27/24 20:59  12/1/24 20:08


1 GM


 


 Wound Care/


 Dressing Products


  (Venelex


 Ointment)  1 gm  TID


 TP


   11/27/24 21:00


 11/29/24 12:57 DC 11/29/24 09:35


1 GM











DIAGNOSTICS / RADIOLOGY:


[ ]





ASSESSMENT:


Acute hypoxemic hypercapnic respiratory failure, requiring BiPAP 


COPD exacerbation


Positive ESBL Proteus mirabilis and Klebsiella pneumoniae on sputum, POA


Acute complicated cystitis, POA


Chronic bilateral lower extremity venous ulcers


Acute on chronic lower extremity cellulitis


Anemia of chronic disease 


Hypomagnesemia


Hyperglycemia


Obstructive sleep apnea


Morbid obesity, BMI 41.1


Chronic problem list:  HLP, BPH, anemia, chronic back and knee pain, PTSD, 

morbid obesity


Deconditioned


Functional decline





PLAN:


-continue admission in the medical-surgical unit


-continue telemetry monitoring


-Monitor respiratory status closely, continue with 3 L nasal cannula


-continue BiPAP, 


-titrate O2.


-monitor ABGs and chest x-ray


-continue Albuterol and Atrovent nebulizer treatments scheduled q.4 hours.  


-RT to provide IS hourly as tolerated and education on use.


-Robitussin DM as needed cough.


-continue montelukast 10 mg p.o. daily.


-continue Mucomyst 400 mg IH q.4 hours.


-corticosteroids have been discontinued


-following pulmonologist's recommendations


-continue IV Merrem and linezolid


-Dr. Marquez for antimicrobial stewardship.


-continue Glucometer checks AC & HS needed with insulin regular sliding scale 

coverage as needed.


-Blood pressure checks every 4 hours and as needed. 


-continue to work with physical therapy


-monitor a.m. labs


-PRN Treatment - Add when necessary meds for nausea, vomiting, pain, 

constipation, insomnia.


-DVT/GI prophylaxis- Continue Lovenox at current doses.


-Full CODE STATUS


-Case management coordinating for SNF placement





This document was generated in part using voice recognition software, occasional

wrong word or sound alike substitutions may have occurred due to the inherent 

limitations of voice recognition software.  Read the chart carefully and 

recognize using context, where the substitutions have occurred.  Although every 

effort was made to edit the content, transcription and typing errors may occur





Case was seen and examined with Dr. Feliciano, above plan was formulated


ATTESTATION BY PHYSICIAN


I have seen and examined the patient. I reviewed the documentation, medical 

decision making, and treatment plan as noted by the mid-level provider above. I 

agree with the findings and plan of care.











Rupa Feliciano MD, JANICE B [facility]           Dec 2, 2024 09:59

## 2024-12-02 NOTE — PN
INFECTIOUS DISEASE  PROGRESS NOTE








Date of Service: Dec 2, 2024





SUBJECTIVE:


This is an 79-year-old male patient with past medical history of COPD with 

oxygen dependent and chronic bilateral lower extremities venous ulcers who was 

admitted to the hospital with chief complaint of shortness of breaths.


Patient was seen and examined at bedside in room 302.  Patient is awake, alert 

and oriented x3. Lower extremity swelling and erythema continues to improve.  

Lower extremities wrapped with Kerlix due to chronic ulcers.  Patient continues 

on meropenem and linezolid IV.  Remains on Oxygen support via nasal cannula at 3

liters/minute.  Patient has been referred to [facility] and pending 

insurance approval.  We will continue to monitor patient's care.








PHYSICAL EXAM


EYES:  Anicteric.  Pupils equal and reactive.


HENT: No oral thrush seen, moist Oral mucosa.


NECK:  Supple, no JVD or thyromegaly.


LUNGS:  Good air entry.  no rhonchi.  Crackles to bilateral upper lobes. Oxygen 

support.


CARDIOVASCULAR:  S1, S2 regular.  No murmur heard.


ABDOMEN:  Soft, non tender, bowel sounds present, no organomegaly.


CENTRAL NERVOUS SYSTEM:  Awake, alert, oriented x 3.  


SKIN:  No rashes, no swelling.  Bilateral lower extremity erythema chronic 

ulcers.


LYMPHATICS: No peripheral lymphadenopathy.


MUSCULOSKELETAL:  No joint swelling, erythema or tenderness.


EXTREMITIES:  No cyanosis or clubbing.  Bilateral lower extremity edema.


BACK:  No deformity, no pressure ulcer.


GENITOURINARY:  No dysuria or hematuria.








Vital Sign (Last 12 Hours)








 12/2/24 12/2/24 12/2/24 12/2/24





 04:04 07:05 07:07 08:00


 


Temp 98.1   97.9


 


Pulse 91 83 83 80


 


Resp 18 21 21 20


 


B/P (MAP) 136/73   147/61


 


Pulse Ox 92   92


 


O2 Delivery Nasal Cannula  N/Cannula Low lpm Nasal Cannula


 


O2 Flow Rate 3.0  2.0 3.0


 


    





 12/2/24 12/2/24  





 10:08 12:00  


 


Temp  97.5  


 


Pulse 86 83  


 


Resp 21 19  


 


B/P (MAP)  144/68  


 


Pulse Ox  94  


 


O2 Delivery  Nasal Cannula  


 


O2 Flow Rate  3.0  














Intake & Output (last 24hrs)   


 


 12/1/24 12/1/24 12/2/24





 14:59 22:59 06:59


 


Intake Total  1900.0 ml 540.0 ml


 


Output Total  1900 ml 1200 ml


 


Balance  0 ml -660.0 ml











LABS:








                                   Laboratory:








Test


 12/2/24


11:47 12/2/24


05:29 Range/Units


 


 


Whole Blood Glucose 164 H    MG/DL


 


White Blood Count  7.1  4.8-10.8  K/uL


 


Red Blood Count


 


 4.35 L


 4.50-6.20


MIL/uL


 


Hemoglobin  11.5 L 14.0-18.0  g/dL


 


Hematocrit  37.8 L 42-54  %


 


Mean Corpuscular Volume  86.9  79-99  fL


 


Mean Corpuscular Hemoglobin  26.4 L 27.0-33.0  pg


 


Mean Corpuscular Hemoglobin


Concent 


 30.4 L


 32.0-36.0  g/dL





 


Red Cell Distribution Width  16.7 H 11.0-15.5  %


 


Platelet Count  295  130-400  K/uL


 


Mean Platelet Volume  9.1  7.5-10.5  fL


 


Immature Granulocyte % (Auto)  1.4 H 0-1  %


 


Neutrophils (%) (Auto)  68.4  40.0-77.0  %


 


Lymphocytes (%) (Auto)  19.4 L 21.0-51.0  %


 


Monocytes (%) (Auto)  6.8  3.0-13.0  %


 


Eosinophils (%) (Auto)  3.4  0.0-8.0  %


 


Basophils (%) (Auto)  0.6  0.0-5.0  %


 


Neutrophils # (Auto)  4.9  1.8-7.7  K/uL


 


Lymphocytes # (Auto)  1.4  1.0-4.8  K/uL


 


Monocytes # (Auto)  0.5  0.1-1.0  K/uL


 


Eosinophils # (Auto)  0.24  0.00-0.70  K/uL


 


Basophils # (Auto)  0.04  0.00-0.20  K/uL


 


Absolute Immature Granulocyte


(auto 


 0.10 


 0-1  K/uL





 


Nucleated Red Blood Cells  0.0  0.0-0.19  %


 


Sodium Level  139  136-145  mmol/L


 


Potassium Level  3.6  3.5-5.1  mmol/L


 


Chloride Level  99 L 101-111  mmol/L


 


Carbon Dioxide Level  34 H 21-32  mmol/L


 


Blood Urea Nitrogen  34 H 7-18  mg/dL


 


Creatinine  1.4 H 0.5-1.3  mg/dL


 


Glomerular Filtration Rate


Calc 


 51 


 >90  mL/min





 


Random Glucose  161 H   mg/dL


 


Total Calcium  8.0 L 8.5-10.1  mg/dL














ASSESSMENT:


Hypoxic respiratory failure.


Gram-negative Pneumonia.


Bilateral lower extremity cellulitis.


Bilateral lower extremities chronic leg ulcers.


Medical noncompliance.  


COPD with home O2 dependent.


Morbid obesity.





PLAN:


Continue on meropenem.


Continue linezolid.  


Keep lower extremities Elevated.


Continue bronchodilators.


Continue oxygen support.  


Continue diuretics as currently ordered.


Pending case management evaluation for SNF referral.





This case was reviewed and discussed with my supervising physician and the above

assessment and plan was formulated and agreed upon.


ATTESTATION BY PHYSICIAN


I have seen and examined the patient. I reviewed the documentation, medical 

decision making, and treatment plan as noted by the mid-level provider above. I 

agree with the findings and plan of care.











MELVIN GARCIA MD, MIRTA L [facility]              Dec 2, 2024 14:09

## 2024-12-02 NOTE — NUR
NURSING ROUNDS

PATIENT ALERT AND ORIENTED X 4, AWAKE WITH FAMILY AT BEDSIDE WATCHING LAPTOP. ADMINISTERED 
SCHEDULED MEDICATION, PATIENT DENIES PAIN. PATIENT REFUSED WOUND CARE TO BE DONE. AS PER 
RAJI NURSE DURING DAY PATIENT HAD REFUSED WOUND CARE DUE TO STATING LIKES TO HAVE WOUND 
CARE AT NIGHT INSTEAD. PATIENT ASKED IF IS READY FOR WOUND CARE AND PATIENT STATED "I 
ALREADY HAD IT EARLIER". INFORMED PATIENT THAT NURSE RAJI STATED WOUND CARE WAS NOT DONE 
BY HER DURING DAY AND IS TO HAVE WOUND CARE DONE TONIGHT TO BILATERAL LOWER EXTREMITIES, 
PATIENT CONTINUED TO REFUSE. PATIENT EDUCATED ON IMPORTANCE OF WOUND CARE DONE EVERY DAY AS 
ORDERED BY PROVIDER DUE TO ALREADY HAVING CELLULITIS TO LOWER EXTREMITIES. PATIENT STATED 
UNDERSTANDING OF EDUCATION BUT CONTINUED TO REFUSE AND REFUSAL FORM SIGNED, WITH WIFE IN 
ROOM TO WITNESS REFUSAL. PATIENT PROVIDED WITH ICE WATER, NURSE OFFERED WIFE BLANKET. CALL 
LIGHT WITHIN REACH, BED ALARM ARMED.

## 2024-12-03 VITALS
HEART RATE: 105 BPM | DIASTOLIC BLOOD PRESSURE: 60 MMHG | SYSTOLIC BLOOD PRESSURE: 120 MMHG | RESPIRATION RATE: 19 BRPM | TEMPERATURE: 98.4 F

## 2024-12-03 VITALS
RESPIRATION RATE: 19 BRPM | TEMPERATURE: 98.4 F | SYSTOLIC BLOOD PRESSURE: 106 MMHG | HEART RATE: 83 BPM | DIASTOLIC BLOOD PRESSURE: 42 MMHG

## 2024-12-03 VITALS — HEART RATE: 80 BPM | RESPIRATION RATE: 20 BRPM

## 2024-12-03 VITALS
HEART RATE: 80 BPM | SYSTOLIC BLOOD PRESSURE: 121 MMHG | DIASTOLIC BLOOD PRESSURE: 55 MMHG | RESPIRATION RATE: 20 BRPM | TEMPERATURE: 97.8 F

## 2024-12-03 VITALS — HEART RATE: 84 BPM | RESPIRATION RATE: 20 BRPM | OXYGEN SATURATION: 94 %

## 2024-12-03 VITALS — OXYGEN SATURATION: 97 %

## 2024-12-03 VITALS — HEART RATE: 79 BPM | RESPIRATION RATE: 20 BRPM

## 2024-12-03 VITALS — RESPIRATION RATE: 20 BRPM | HEART RATE: 85 BPM

## 2024-12-03 LAB
BUN SERPL-MCNC: 37 MG/DL (ref 7–18)
CHLORIDE SERPL-SCNC: 99 MMOL/L (ref 101–111)
CO2 SERPL-SCNC: 36 MMOL/L (ref 21–32)
CREAT SERPL-MCNC: 1.4 MG/DL (ref 0.5–1.3)
ERYTHROCYTE [DISTWIDTH] IN BLOOD BY AUTOMATED COUNT: 16.6 % (ref 11–15.5)
GFR SERPL CREATININE-BSD FRML MDRD: 51 ML/MIN (ref 90–?)
GLUCOSE SERPL-MCNC: 174 MG/DL (ref 70–105)
HCT VFR BLD AUTO: 40.1 % (ref 42–54)
MCH RBC QN AUTO: 26.5 PG (ref 27–33)
MCHC RBC AUTO-ENTMCNC: 30.4 G/DL (ref 32–36)
MCV RBC AUTO: 87.2 FL (ref 79–99)
NRBC BLD MANUAL-RTO: 0 % (ref 0–0.19)
PLATELET # BLD AUTO: 310 K/UL (ref 130–400)
POTASSIUM SERPL-SCNC: 3.9 MMOL/L (ref 3.5–5.1)
RBC # BLD AUTO: 4.6 MIL/UL (ref 4.5–6.2)
SODIUM SERPL-SCNC: 139 MMOL/L (ref 136–145)
WBC # BLD AUTO: 9.1 K/UL (ref 4.8–10.8)

## 2024-12-03 NOTE — NUR
NURSING ROUNDS

PATIENT APPLIED CALL LIGHT, REQUESTED SNACK, SANDWICH AND JELLO GIVEN. DENIES PAIN AT THIS 
TIME, CONTINUES WATCHING SHOW ON LAPTOP.

## 2024-12-03 NOTE — PN
INFECTIOUS DISEASE  PROGRESS NOTE








Date of Service: Dec 3, 2024





SUBJECTIVE:


This is an 79-year-old male patient with past medical history of COPD with 

oxygen dependent and chronic bilateral lower extremities venous ulcers who was 

admitted to the hospital with chief complaint of shortness of breaths.


Patient was seen and examined at bedside in room 302.  


Patient is awake, alert and oriented x 3.  Patient is afebrile, temperature is 

98.4 and a WBC of 9.1.  Patient continues on meropenem and linezolid IV.  

Remains on Oxygen support via nasal cannula at 3 liters/minute.  Patient is 

pending insurance approval to [facility].  From Infectious Disease 

standpoint patient can be discharged on cephalexin and doxycycline p.o x 10 

days.  Prescription was written and given to nurse.








PHYSICAL EXAM


EYES:  Anicteric.  Pupils equal and reactive.


HENT: No oral thrush seen, moist Oral mucosa.


NECK:  Supple, no JVD or thyromegaly.


LUNGS:  Good air entry.  no rhonchi.  Crackles to bilateral upper lobes. Oxygen 

support.


CARDIOVASCULAR:  S1, S2 regular.  No murmur heard.


ABDOMEN:  Soft, non tender, bowel sounds present, no organomegaly.


CENTRAL NERVOUS SYSTEM:  Awake, alert, oriented x 3.  


SKIN:  No rashes, no swelling.  Bilateral lower extremity erythema chronic 

ulcers.


LYMPHATICS: No peripheral lymphadenopathy.


MUSCULOSKELETAL:  No joint swelling, erythema or tenderness.


EXTREMITIES:  No cyanosis or clubbing.  Bilateral lower extremity edema.


BACK:  No deformity, no pressure ulcer.


GENITOURINARY:  No dysuria or hematuria.








Vital Sign (Last 12 Hours)








 12/3/24 12/3/24 12/3/24 12/3/24





 03:42 07:00 07:52 08:54


 


Temp 97.9  98.4 


 


Pulse 80 80 83 84


 


Resp 20 20 19 20


 


B/P (MAP) 121/55  106/42 


 


Pulse Ox 90  97 


 


O2 Delivery Nasal Cannula  Nasal Cannula N/Cannula Low lpm


 


O2 Flow Rate 3.0  3.0 2.0


 


FiO2    28


 


    





 12/3/24 12/3/24  





 11:19 11:30  


 


Temp 98.4   


 


Pulse 105 85  


 


Resp 19 20  


 


B/P (MAP) 120/60   


 


Pulse Ox 94   


 


O2 Delivery Nasal Cannula   


 


O2 Flow Rate 3.0   














Intake & Output (last 24hrs)   


 


 12/2/24 12/2/24 12/3/24





 15:00 23:00 07:00


 


Output Total  1000 ml 1100 ml


 


Balance  -1000 ml -1100 ml











LABS:








                                   Laboratory:








Test


 12/3/24


10:50 12/3/24


03:20 12/2/24


05:29 Range/Units


 


 


Whole Blood Glucose 115 H     MG/DL


 


White Blood Count  9.1 #  4.8-10.8  K/uL


 


Red Blood Count


 


 4.60 


 


 4.50-6.20


MIL/uL


 


Hemoglobin  12.2 L  14.0-18.0  g/dL


 


Hematocrit  40.1 L  42-54  %


 


Mean Corpuscular Volume  87.2   79-99  fL


 


Mean Corpuscular Hemoglobin  26.5 L  27.0-33.0  pg


 


Mean Corpuscular Hemoglobin


Concent 


 30.4 L


 


 32.0-36.0  g/dL





 


Red Cell Distribution Width  16.6 H  11.0-15.5  %


 


Platelet Count  310   130-400  K/uL


 


Mean Platelet Volume  8.8   7.5-10.5  fL


 


Nucleated Red Blood Cells  0.0   0.0-0.19  %


 


Sodium Level  139   136-145  mmol/L


 


Potassium Level  3.9   3.5-5.1  mmol/L


 


Chloride Level  99 L  101-111  mmol/L


 


Carbon Dioxide Level  36 H  21-32  mmol/L


 


Blood Urea Nitrogen  37 H  7-18  mg/dL


 


Creatinine  1.4 H  0.5-1.3  mg/dL


 


Glomerular Filtration Rate


Calc 


 51 


 


 >90  mL/min





 


Random Glucose  174 H    mg/dL


 


Total Calcium  8.1 L  8.5-10.1  mg/dL


 


Immature Granulocyte % (Auto)   1.4 H 0-1  %


 


Neutrophils (%) (Auto)   68.4  40.0-77.0  %


 


Lymphocytes (%) (Auto)   19.4 L 21.0-51.0  %


 


Monocytes (%) (Auto)   6.8  3.0-13.0  %


 


Eosinophils (%) (Auto)   3.4  0.0-8.0  %


 


Basophils (%) (Auto)   0.6  0.0-5.0  %


 


Neutrophils # (Auto)   4.9  1.8-7.7  K/uL


 


Lymphocytes # (Auto)   1.4  1.0-4.8  K/uL


 


Monocytes # (Auto)   0.5  0.1-1.0  K/uL


 


Eosinophils # (Auto)   0.24  0.00-0.70  K/uL


 


Basophils # (Auto)   0.04  0.00-0.20  K/uL


 


Absolute Immature Granulocyte


(auto 


 


 0.10 


 0-1  K/uL

















ASSESSMENT:


Hypoxic respiratory failure.


Gram-negative Pneumonia.


Bilateral lower extremity cellulitis.


Bilateral lower extremities chronic leg ulcers.


Medical noncompliance.  


COPD with home O2 dependent.


Morbid obesity.





PLAN:


Continue on meropenem.


Continue linezolid.  


Keep legs Elevated.


Continue bronchodilators.


Continue oxygen support.  


Continue diuretics as currently ordered.


Patient has been referred to [facility] and pending insurance 

approval.


From Infectious Disease standpoint patient can be discharged on cephalexin and 

doxycycline p.o. X 10 days. 


Prescription was written and given to nurse.





This case was reviewed and discussed with my supervising physician and the above

assessment and plan was formulated and agreed upon.


ATTESTATION BY PHYSICIAN


I have seen and examined the patient. I reviewed the documentation, medical 

decision making, and treatment plan as noted by the mid-level provider above. I 

agree with the findings and plan of care.











MELVIN GARCIA MD, MIRTA L [facility]              Dec 3, 2024 12:39

## 2024-12-04 NOTE — DS
Discharge Summary


Hospital Course Summary:


Late dictation: 12/3/2024- DOS


Assessment/Plan:





ASSESSMENT:


Acute hypoxemic hypercapnic respiratory failure, requiring BiPAP 


COPD exacerbation


Positive ESBL Proteus mirabilis and Klebsiella pneumoniae on sputum, POA


Acute complicated cystitis, POA


Chronic bilateral lower extremity venous ulcers


Acute on chronic lower extremity cellulitis


Anemia of chronic disease 


Hypomagnesemia


Hyperglycemia


Obstructive sleep apnea


Morbid obesity, BMI 41.1


Chronic problem list:  HLP, BPH, anemia, chronic back and knee pain, PTSD, 

morbid obesity


Deconditioned


Functional decline





PLAN:


-continue admission in the medical-surgical unit


-continue telemetry monitoring


-Monitor respiratory status closely, continue with 3 L nasal cannula


-continue BiPAP, 


-titrate O2.


-monitor ABGs and chest x-ray


-continue Albuterol and Atrovent nebulizer treatments scheduled q.4 hours.  


-RT to provide IS hourly as tolerated and education on use.


-Robitussin DM as needed cough.


-continue montelukast 10 mg p.o. daily.


-continue Mucomyst 400 mg IH q.4 hours.


-corticosteroids have been discontinued


-following pulmonologist's recommendations


-continue IV Merrem and linezolid


-Dr. Marquez for antimicrobial stewardship.


-continue Glucometer checks AC & HS needed with insulin regular sliding scale 

coverage as needed.


-Blood pressure checks every 4 hours and as needed. 


-continue to work with physical therapy


-monitor a.m. labs


-PRN Treatment - Add when necessary meds for nausea, vomiting, pain, 

constipation, insomnia.


-DVT/GI prophylaxis- Continue Lovenox at current doses.


-Full CODE STATUS


-Case management coordinating for SNF placement





This document was generated in part using voice recognition software, occasional

wrong word or sound alike substitutions may have occurred due to the inherent 

limitations of voice recognition software.  Read the chart carefully and 

recognize using context, where the substitutions have occurred.  Although every 

effort was made to edit the content, transcription and typing errors may occur





Case was seen and examined with Dr. Feliciano, above plan was formulated


Home Medications:


Reported Medications


Ondansetron HCl (Ondansetron HCl) 4 Mg Tablet, 4 MG PO Q6HPRN, TAB


   5/8/24


Prazosin HCl (Prazosin HCl) 1 Mg Capsule, 1 MG PO HS, CAP


   5/8/24


Atorvastatin Calcium (LIPITOR) 40 Mg Tablet, 40 MG PO HS, TAB


   5/8/24


Sertraline HCl (Sertraline HCl) 25 Mg Tablet, 25 MG PO DAILY, TAB


   5/8/24


Ropinirole HCl (Ropinirole HCl) 4 Mg Tablet, 4 MG PO QIDP PRN for LEG PAIN, TAB


   7/8/23


Multivitamin (Multivitamin) 1 Each Tablet, 1 EACH PO DAILY, TAB


   3/27/23


Tamsulosin HCl (Flomax) 0.4 Mg Cap.er.24h, 0.4 MG PO HS, CAPSULE.DR


   3/27/23


Potassium Chloride (Potassium Chloride) 20 Meq Tablet.er, 40 MEQ PO DAILY, TAB


   3/27/23


Simvastatin (ZOCOR) 40 Mg Tablet, 40 MG PO HS, TAB


   3/27/23


Discontinued Reported Medications


Metolazone (Metolazone) 5 Mg Tablet, 5 MG PO Q2D, TAB


   5/8/24


Spironolactone (Spironolactone) 25 Mg Tablet, 25 MG PO DAILY, TAB


   3/27/23


Venlafaxine HCl (Venlafaxine HCl) 50 Mg Tablet, 150 MG PO DAILY, TAB


   3/27/23


Mirtazapine (Mirtazapine) 30 Mg Tablet, 30 MG PO HS, TAB


   3/27/23











NATALY KENNY AGPCNP           Dec 4, 2024 16:54